# Patient Record
Sex: FEMALE | Race: BLACK OR AFRICAN AMERICAN | NOT HISPANIC OR LATINO
[De-identification: names, ages, dates, MRNs, and addresses within clinical notes are randomized per-mention and may not be internally consistent; named-entity substitution may affect disease eponyms.]

---

## 2017-12-15 ENCOUNTER — TRANSCRIPTION ENCOUNTER (OUTPATIENT)
Age: 32
End: 2017-12-15

## 2018-03-17 ENCOUNTER — TRANSCRIPTION ENCOUNTER (OUTPATIENT)
Age: 33
End: 2018-03-17

## 2019-05-28 ENCOUNTER — TRANSCRIPTION ENCOUNTER (OUTPATIENT)
Age: 34
End: 2019-05-28

## 2020-02-04 ENCOUNTER — EMERGENCY (EMERGENCY)
Facility: HOSPITAL | Age: 35
LOS: 0 days | Discharge: HOME | End: 2020-02-04
Attending: EMERGENCY MEDICINE
Payer: COMMERCIAL

## 2020-02-04 VITALS
WEIGHT: 179.9 LBS | SYSTOLIC BLOOD PRESSURE: 132 MMHG | HEIGHT: 68 IN | DIASTOLIC BLOOD PRESSURE: 97 MMHG | HEART RATE: 87 BPM | OXYGEN SATURATION: 98 % | TEMPERATURE: 98 F
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
## 2020-02-04 DIAGNOSIS — M25.512 PAIN IN LEFT SHOULDER: ICD-10-CM
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
## 2020-02-04 PROCEDURE — 99284 EMERGENCY DEPT VISIT MOD MDM: CPT

## 2020-02-04 PROCEDURE — 73030 X-RAY EXAM OF SHOULDER: CPT | Mod: 26,LT

## 2020-02-04 RX ORDER — KETOROLAC TROMETHAMINE 30 MG/ML
60 SYRINGE (ML) INJECTION ONCE
Refills: 0 | Status: DISCONTINUED | OUTPATIENT
Start: 2020-02-04 | End: 2020-02-04

## 2020-02-04 RX ORDER — OXYCODONE AND ACETAMINOPHEN 5; 325 MG/1; MG/1
1 TABLET ORAL ONCE
Refills: 0 | Status: DISCONTINUED | OUTPATIENT
Start: 2020-02-04 | End: 2020-02-04

## 2020-02-04 RX ORDER — IBUPROFEN 200 MG
1 TABLET ORAL
Qty: 15 | Refills: 0
Start: 2020-02-04 | End: 2020-02-08

## 2020-02-04 RX ORDER — IBUPROFEN 200 MG
600 TABLET ORAL ONCE
Refills: 0 | Status: DISCONTINUED | OUTPATIENT
Start: 2020-02-04 | End: 2020-02-04

## 2020-02-04 RX ADMIN — Medication 60 MILLIGRAM(S): at 05:09

## 2020-02-04 NOTE — ED PROVIDER NOTE - PATIENT PORTAL LINK FT
You can access the FollowMyHealth Patient Portal offered by [facility] by registering at the following website: [email or web address]. By joining [facility]’s FollowMyHealth portal, you will also be able to view your health information using other applications (apps) compatible with our system.

## 2020-02-04 NOTE — ED ADULT NURSE NOTE - OBJECTIVE STATEMENT
pt received to bed E c/o pain in left shoulder. pt states she injured her shoulder while lifting weights today. denies: head injury, LOC. c/o difficulty moving left arm. witnessed pt ambulate with steady gait with standby assistance. family at bedside

## 2020-02-04 NOTE — ED PROVIDER NOTE - CARE PROVIDER_API CALL
Kenneth Mina (DO)  Orthopaedic Surgery  [address] [place] [address]  Phone: [phone]  Fax: [phone]  Follow Up Time:

## 2020-02-04 NOTE — ED ADULT NURSE REASSESSMENT NOTE - NS ED NURSE REASSESS COMMENT FT1
pt denies pregnancy. pt consulting with family member and changing her mind multiple times about which medications she wants and is refusing. Dr. Barone made aware

## 2020-02-04 NOTE — ED PROVIDER NOTE - PHYSICAL EXAMINATION
Gen: Alert, NAD  Head: NC, AT   Eyes: PERRL, EOMI, normal lids/conjunctiva  ENT: normal hearing, patent oropharynx without erythema/exudate, uvula midline  Neck: supple, no tenderness, Trachea midline  Pulm: Bilateral BS, normal resp effort, no wheeze/stridor/retractions  CV: RRR, no M/R/G, 2+ radial and dp pulses bl, no edema  Abd: soft, NT/ND, +BS, no hepatosplenomegaly  Mskel: left shoulder is markedly ttp on the top of the shoulder roughly where the acromion is. no ctl spine ttp.   Skin: no rash, no bruising   Neuro: AAOx3, no sensory/motor deficits, CN 2-12 intact

## 2020-02-04 NOTE — ED ADULT NURSE NOTE - CHIEF COMPLAINT QUOTE
pt c/o L-shoulder pain since 11pm.  pt was working out with weights.  [facility] 2/3/2020

## 2020-02-04 NOTE — ED PROVIDER NOTE - NSFOLLOWUPINSTRUCTIONS_ED_ALL_ED_FT
Call the orthopedic doctor for a follow up appointment as needed.    Take the pain medications prescribed.    Apply ice to the affected area as often as you can for the next few days.

## 2020-02-04 NOTE — ED ADULT TRIAGE NOTE - CHIEF COMPLAINT QUOTE
pt c/o L-shoulder pain since 11pm.  pt was working out with weights.  [facility] 2/3/2020

## 2020-04-04 ENCOUNTER — TRANSCRIPTION ENCOUNTER (OUTPATIENT)
Age: 35
End: 2020-04-04

## 2021-04-24 ENCOUNTER — TRANSCRIPTION ENCOUNTER (OUTPATIENT)
Age: 36
End: 2021-04-24

## 2021-04-30 PROBLEM — Z78.9 OTHER SPECIFIED HEALTH STATUS: Chronic | Status: ACTIVE | Noted: 2020-02-04

## 2021-05-26 ENCOUNTER — NON-APPOINTMENT (OUTPATIENT)
Age: 36
End: 2021-05-26

## 2021-05-26 ENCOUNTER — APPOINTMENT (OUTPATIENT)
Dept: DERMATOLOGY | Facility: CLINIC | Age: 36
End: 2021-05-26
Payer: COMMERCIAL

## 2021-05-26 VITALS — WEIGHT: 195 LBS | BODY MASS INDEX: 28.88 KG/M2 | HEIGHT: 69 IN

## 2021-05-26 DIAGNOSIS — L42 PITYRIASIS ROSEA: ICD-10-CM

## 2021-05-26 DIAGNOSIS — L72.3 SEBACEOUS CYST: ICD-10-CM

## 2021-05-26 DIAGNOSIS — L70.9 ACNE, UNSPECIFIED: ICD-10-CM

## 2021-05-26 PROCEDURE — 99203 OFFICE O/P NEW LOW 30 MIN: CPT | Mod: 25

## 2021-05-26 PROCEDURE — 11900 INJECT SKIN LESIONS </W 7: CPT

## 2021-06-01 ENCOUNTER — EMERGENCY (EMERGENCY)
Facility: HOSPITAL | Age: 36
LOS: 1 days | Discharge: HOME | End: 2021-06-01
Attending: EMERGENCY MEDICINE
Payer: COMMERCIAL

## 2021-06-01 VITALS
TEMPERATURE: 98 F | RESPIRATION RATE: 19 BRPM | HEIGHT: 69 IN | OXYGEN SATURATION: 98 % | SYSTOLIC BLOOD PRESSURE: 121 MMHG | HEART RATE: 70 BPM | WEIGHT: 195.11 LBS | DIASTOLIC BLOOD PRESSURE: 74 MMHG

## 2021-06-01 LAB
ALBUMIN SERPL ELPH-MCNC: 4.2 G/DL — SIGNIFICANT CHANGE UP (ref 3.3–5)
ALP SERPL-CCNC: 53 U/L — SIGNIFICANT CHANGE UP (ref 40–120)
ALT FLD-CCNC: 15 U/L — SIGNIFICANT CHANGE UP (ref 10–45)
ANION GAP SERPL CALC-SCNC: 10 MMOL/L — SIGNIFICANT CHANGE UP (ref 5–17)
APPEARANCE UR: CLEAR — SIGNIFICANT CHANGE UP
AST SERPL-CCNC: 13 U/L — SIGNIFICANT CHANGE UP (ref 10–40)
BASOPHILS # BLD AUTO: 0.04 K/UL — SIGNIFICANT CHANGE UP (ref 0–0.2)
BASOPHILS NFR BLD AUTO: 0.8 % — SIGNIFICANT CHANGE UP (ref 0–2)
BILIRUB SERPL-MCNC: 0.2 MG/DL — SIGNIFICANT CHANGE UP (ref 0.2–1.2)
BILIRUB UR-MCNC: NEGATIVE — SIGNIFICANT CHANGE UP
BUN SERPL-MCNC: 11 MG/DL — SIGNIFICANT CHANGE UP (ref 7–23)
CALCIUM SERPL-MCNC: 9.4 MG/DL — SIGNIFICANT CHANGE UP (ref 8.4–10.5)
CHLORIDE SERPL-SCNC: 105 MMOL/L — SIGNIFICANT CHANGE UP (ref 96–108)
CO2 SERPL-SCNC: 24 MMOL/L — SIGNIFICANT CHANGE UP (ref 22–31)
COLOR SPEC: SIGNIFICANT CHANGE UP
CREAT SERPL-MCNC: 0.78 MG/DL — SIGNIFICANT CHANGE UP (ref 0.5–1.3)
DIFF PNL FLD: ABNORMAL
EOSINOPHIL # BLD AUTO: 0.12 K/UL — SIGNIFICANT CHANGE UP (ref 0–0.5)
EOSINOPHIL NFR BLD AUTO: 2.5 % — SIGNIFICANT CHANGE UP (ref 0–6)
GLUCOSE SERPL-MCNC: 88 MG/DL — SIGNIFICANT CHANGE UP (ref 70–99)
GLUCOSE UR QL: NEGATIVE — SIGNIFICANT CHANGE UP
HCG SERPL-ACNC: <2 MIU/ML — SIGNIFICANT CHANGE UP
HCT VFR BLD CALC: 38.7 % — SIGNIFICANT CHANGE UP (ref 34.5–45)
HGB BLD-MCNC: 12.2 G/DL — SIGNIFICANT CHANGE UP (ref 11.5–15.5)
IMM GRANULOCYTES NFR BLD AUTO: 0.2 % — SIGNIFICANT CHANGE UP (ref 0–1.5)
KETONES UR-MCNC: NEGATIVE — SIGNIFICANT CHANGE UP
LEUKOCYTE ESTERASE UR-ACNC: ABNORMAL
LYMPHOCYTES # BLD AUTO: 2.67 K/UL — SIGNIFICANT CHANGE UP (ref 1–3.3)
LYMPHOCYTES # BLD AUTO: 55.9 % — HIGH (ref 13–44)
MCHC RBC-ENTMCNC: 28.9 PG — SIGNIFICANT CHANGE UP (ref 27–34)
MCHC RBC-ENTMCNC: 31.5 GM/DL — LOW (ref 32–36)
MCV RBC AUTO: 91.7 FL — SIGNIFICANT CHANGE UP (ref 80–100)
MONOCYTES # BLD AUTO: 0.55 K/UL — SIGNIFICANT CHANGE UP (ref 0–0.9)
MONOCYTES NFR BLD AUTO: 11.5 % — SIGNIFICANT CHANGE UP (ref 2–14)
NEUTROPHILS # BLD AUTO: 1.39 K/UL — LOW (ref 1.8–7.4)
NEUTROPHILS NFR BLD AUTO: 29.1 % — LOW (ref 43–77)
NITRITE UR-MCNC: NEGATIVE — SIGNIFICANT CHANGE UP
NRBC # BLD: 0 /100 WBCS — SIGNIFICANT CHANGE UP (ref 0–0)
PH UR: 6.5 — SIGNIFICANT CHANGE UP (ref 5–8)
PLATELET # BLD AUTO: 179 K/UL — SIGNIFICANT CHANGE UP (ref 150–400)
POTASSIUM SERPL-MCNC: 3.9 MMOL/L — SIGNIFICANT CHANGE UP (ref 3.5–5.3)
POTASSIUM SERPL-SCNC: 3.9 MMOL/L — SIGNIFICANT CHANGE UP (ref 3.5–5.3)
PROT SERPL-MCNC: 7.2 G/DL — SIGNIFICANT CHANGE UP (ref 6–8.3)
PROT UR-MCNC: SIGNIFICANT CHANGE UP
RBC # BLD: 4.22 M/UL — SIGNIFICANT CHANGE UP (ref 3.8–5.2)
RBC # FLD: 13.6 % — SIGNIFICANT CHANGE UP (ref 10.3–14.5)
SODIUM SERPL-SCNC: 139 MMOL/L — SIGNIFICANT CHANGE UP (ref 135–145)
SP GR SPEC: 1.02 — SIGNIFICANT CHANGE UP (ref 1.01–1.02)
UROBILINOGEN FLD QL: NEGATIVE — SIGNIFICANT CHANGE UP
WBC # BLD: 4.78 K/UL — SIGNIFICANT CHANGE UP (ref 3.8–10.5)
WBC # FLD AUTO: 4.78 K/UL — SIGNIFICANT CHANGE UP (ref 3.8–10.5)

## 2021-06-01 PROCEDURE — 85025 COMPLETE CBC W/AUTO DIFF WBC: CPT

## 2021-06-01 PROCEDURE — 76830 TRANSVAGINAL US NON-OB: CPT

## 2021-06-01 PROCEDURE — 76830 TRANSVAGINAL US NON-OB: CPT | Mod: 26

## 2021-06-01 PROCEDURE — 81001 URINALYSIS AUTO W/SCOPE: CPT

## 2021-06-01 PROCEDURE — 93975 VASCULAR STUDY: CPT | Mod: 26

## 2021-06-01 PROCEDURE — 84702 CHORIONIC GONADOTROPIN TEST: CPT

## 2021-06-01 PROCEDURE — 93975 VASCULAR STUDY: CPT

## 2021-06-01 PROCEDURE — 99285 EMERGENCY DEPT VISIT HI MDM: CPT

## 2021-06-01 PROCEDURE — 76770 US EXAM ABDO BACK WALL COMP: CPT

## 2021-06-01 PROCEDURE — 76770 US EXAM ABDO BACK WALL COMP: CPT | Mod: 26,59

## 2021-06-01 PROCEDURE — 96374 THER/PROPH/DIAG INJ IV PUSH: CPT

## 2021-06-01 PROCEDURE — 80053 COMPREHEN METABOLIC PANEL: CPT

## 2021-06-01 PROCEDURE — 99284 EMERGENCY DEPT VISIT MOD MDM: CPT | Mod: 25

## 2021-06-01 RX ORDER — KETOROLAC TROMETHAMINE 30 MG/ML
15 SYRINGE (ML) INJECTION ONCE
Refills: 0 | Status: DISCONTINUED | OUTPATIENT
Start: 2021-06-01 | End: 2021-06-01

## 2021-06-01 RX ORDER — ACETAMINOPHEN 500 MG
975 TABLET ORAL ONCE
Refills: 0 | Status: COMPLETED | OUTPATIENT
Start: 2021-06-01 | End: 2021-06-01

## 2021-06-01 RX ADMIN — Medication 15 MILLIGRAM(S): at 22:34

## 2021-06-01 RX ADMIN — Medication 975 MILLIGRAM(S): at 22:35

## 2021-06-01 NOTE — ED PROVIDER NOTE - OBJECTIVE STATEMENT
37 yo F w/ no pmhx presents with 4 day hx of L back pain, and 1 day hx of L groin pain. Reports sharp and pressure like, exacerbated by walking. It is 6/10 pain. No Fevers, CP, SOB, N/V/D, dysuria. No vaginal discharges. LMP early 5/2021. Is sexually active, uses condoms sometimes. No recent trauma.

## 2021-06-01 NOTE — ED PROVIDER NOTE - PHYSICAL EXAMINATION
Vital Signs Last 24 Hrs  T(C): 36.6 (01 Jun 2021 21:00), Max: 36.9 (01 Jun 2021 18:07)  T(F): 97.8 (01 Jun 2021 21:00), Max: 98.4 (01 Jun 2021 18:07)  HR: 83 (01 Jun 2021 21:00) (70 - 83)  BP: 139/77 (01 Jun 2021 21:00) (121/74 - 139/77)  BP(mean): 96 (01 Jun 2021 21:00) (96 - 96)  RR: 16 (01 Jun 2021 21:00) (16 - 19)  SpO2: 100% (01 Jun 2021 21:00) (98% - 100%)  PHYSICAL EXAM:  GENERAL: NAD,   HEART: Regular rate and rhythm; No murmurs, rubs, or gallops  RESPIRATORY: CTA B/L, No W/R/R  ABDOMEN: Soft, Nontender, Nondistended; Bowel sounds present. NO CVA tenderness b/l. Left Groin TTP, no lesions appreciated  NEUROLOGY: A&Ox3  EXTREMITIES: No clubbing, cyanosis, or edema  SKIN: warm, dry, normal color, no rash or abnormal lesions

## 2021-06-01 NOTE — ED ADULT NURSE NOTE - OBJECTIVE STATEMENT
37 y/o F AAOX4 presents to ED c/o back/groin pain. Pt states 3 days ago, pt began experiencing a dull pressure in her left flank, began radiating to left groin yesterday. Pt states the pain worsens when walking, and began after lifting her niece. Pt breathing spontaneously, unlabored, SPO2 100% on RA. Pt abdomen soft, tender to LLQ, -CVA tenderness. LMP beginning of May, pt states she is sexually active, sometimes uses protection. Pt denies dysuria, pyuria, hematuria. Last BM this morning, pt states she is eating/drinking normally, denies n/v/d. Denies chest pain, palpitations, SOB, HA, dizziness, numbness, tingling, fever, chills. Patient placed in position of comfort, bed locked and in lowest position. Call bell within reach.

## 2021-06-01 NOTE — ED PROVIDER NOTE - NSFOLLOWUPINSTRUCTIONS_ED_ALL_ED_FT
Your back pain and groin pain is likely muscle strain. You had an ovarian cyst found on the transvaginal ultrasound, please follow up with your primary care doctor for the muscle strain, and follow up with your OBGYN doctor for this ovarian cyst. If you symptoms worsen, please seek medical attention.

## 2021-06-01 NOTE — ED PROVIDER NOTE - NS ED ROS FT
REVIEW OF SYSTEMS:  Constitutional: [-] fevers, [ -] chills, [- ] weight loss, [- ] weight gain  HEENT: [ -] vision problems, [- ] eye pain, [ -] nasal congestion, [- ] rhinorrhea, [- ] sore throat, [- ] dysphagia  CV: [- ] chest pain, [- ] orthopnea, [- ] palpitations  Resp: [- ] cough, [- ] dyspnea, [- ] wheezing, [ -] hemoptysis  GI: [- ] nausea, [- ] vomiting, [- ] diarrhea, [ -] constipation, [+ ] abdominal pain  : [- ] dysuria [- ] nocturia [- ] hematuria [ -] increased urinary frequency  Musculoskeletal: [+] back pain [ -] myalgias [- ] arthralgias [- ] fracture  Skin: [- ] rash [ -] itch  Neurological: [ -] headache [- ] dizziness [- ] syncope [- ] weakness [- ] numbness  Psychiatric: [- ] anxiety [- ] depression  Endocrine: [- ] diabetes [ -] thyroid problem  Hematologic/Lymphatic: [- ] anemia [- ] bleeding problem  Allergic/Immunologic: [ -] itchy eyes [ -] nasal discharge [- ] hives [ -] angioedema

## 2021-06-01 NOTE — ED ADULT NURSE NOTE - PRO INTERPRETER NEED 2
Include Z78.9 (Other Specified Conditions Influencing Health Status) As An Associated Diagnosis?: No Detail Level: Detailed Post-Care Instructions: I reviewed with the patient in detail post-care instructions. Patient is to wear sunprotection, and avoid picking at any of the treated lesions. Pt may apply Vaseline to crusted or scabbing areas. Number Of Freeze-Thaw Cycles: 1 freeze-thaw cycle Duration Of Freeze Thaw-Cycle (Seconds): 20 English Consent: The patient's consent was obtained including but not limited to risks of crusting, scabbing, blistering, scarring, darker or lighter pigmentary change, recurrence, incomplete removal and infection. Medical Necessity Clause: This procedure was medically necessary because the lesions that were treated were: Render Post-Care Instructions In Note?: yes Medical Necessity Information: It is in your best interest to select a reason for this procedure from the list below. All of these items fulfill various CMS LCD requirements except the new and changing color options.

## 2021-06-01 NOTE — ED PROVIDER NOTE - PROGRESS NOTE DETAILS
Attending MD Peng: pelvic (chaperone Seepersaud) with no CMT, scant whitish vaginal discharge, no adnexal ttp. TVUS: showed right complex ovarian cyst  US renal: no hydronephrosis

## 2021-06-01 NOTE — ED PROVIDER NOTE - ATTENDING CONTRIBUTION TO CARE
Attending MD Peng:  I personally have seen and examined this patient.  Resident note reviewed and agree on plan of care and except where noted.  See HPI, PE, and MDM for details.      36F with left groin/pelvic pain and back pain x days, constant, worse with standing. Ttp left pelvis region, nontender otherwise. Ddx includes ovarian pathology, ectopic pregnancy, msk strain. Plan for pelvic exam, labs, TVUS, renal US

## 2021-06-01 NOTE — ED PROVIDER NOTE - CLINICAL SUMMARY MEDICAL DECISION MAKING FREE TEXT BOX
35 yo F w/ no pmhx presents with 4 day hx of L back pain, and 1 day hx of L groin pain: ddx: Ovarian Cyst, UTI, r/o pregnancy, less likely nephrolithiasis, PID    PLAN  CBC, CMP, UA, serum HCG  Renal US, TVUS

## 2021-06-01 NOTE — ED PROVIDER NOTE - PATIENT PORTAL LINK FT
You can access the FollowMyHealth Patient Portal offered by [facility] by registering at the following website: [email or web address]. By joining [facility]’s FollowMyHealth portal, you will also be able to view your health information using other applications (apps) compatible with our system.

## 2021-06-02 VITALS
SYSTOLIC BLOOD PRESSURE: 116 MMHG | RESPIRATION RATE: 16 BRPM | TEMPERATURE: 99 F | OXYGEN SATURATION: 100 % | HEART RATE: 69 BPM | DIASTOLIC BLOOD PRESSURE: 75 MMHG

## 2021-06-26 ENCOUNTER — TRANSCRIPTION ENCOUNTER (OUTPATIENT)
Age: 36
End: 2021-06-26

## 2021-07-22 ENCOUNTER — APPOINTMENT (OUTPATIENT)
Dept: INTERNAL MEDICINE | Facility: CLINIC | Age: 36
End: 2021-07-22
Payer: COMMERCIAL

## 2021-07-22 VITALS
BODY MASS INDEX: 28.58 KG/M2 | DIASTOLIC BLOOD PRESSURE: 73 MMHG | HEART RATE: 67 BPM | WEIGHT: 193 LBS | HEIGHT: 69 IN | TEMPERATURE: 97.8 F | SYSTOLIC BLOOD PRESSURE: 115 MMHG | OXYGEN SATURATION: 99 %

## 2021-07-22 DIAGNOSIS — Z78.9 OTHER SPECIFIED HEALTH STATUS: ICD-10-CM

## 2021-07-22 DIAGNOSIS — Z00.00 ENCOUNTER FOR GENERAL ADULT MEDICAL EXAMINATION W/OUT ABNORMAL FINDINGS: ICD-10-CM

## 2021-07-22 DIAGNOSIS — E04.1 NONTOXIC SINGLE THYROID NODULE: ICD-10-CM

## 2021-07-22 DIAGNOSIS — N83.209 UNSPECIFIED OVARIAN CYST, UNSPECIFIED SIDE: ICD-10-CM

## 2021-07-22 DIAGNOSIS — E66.3 OVERWEIGHT: ICD-10-CM

## 2021-07-22 DIAGNOSIS — G89.29 DORSALGIA, UNSPECIFIED: ICD-10-CM

## 2021-07-22 DIAGNOSIS — M54.9 DORSALGIA, UNSPECIFIED: ICD-10-CM

## 2021-07-22 PROCEDURE — 99385 PREV VISIT NEW AGE 18-39: CPT | Mod: 25

## 2021-07-22 PROCEDURE — G0444 DEPRESSION SCREEN ANNUAL: CPT | Mod: 59

## 2021-07-22 PROCEDURE — 99203 OFFICE O/P NEW LOW 30 MIN: CPT | Mod: 25

## 2021-07-22 NOTE — PHYSICAL EXAM
[No Acute Distress] : no acute distress [Well Nourished] : well nourished [Well Developed] : well developed [Well-Appearing] : well-appearing [Normal Sclera/Conjunctiva] : normal sclera/conjunctiva [PERRL] : pupils equal round and reactive to light [EOMI] : extraocular movements intact [Normal TMs] : both tympanic membranes were normal [No JVD] : no jugular venous distention [No Lymphadenopathy] : no lymphadenopathy [Supple] : supple [No Respiratory Distress] : no respiratory distress  [No Accessory Muscle Use] : no accessory muscle use [Clear to Auscultation] : lungs were clear to auscultation bilaterally [Normal Rate] : normal rate  [Regular Rhythm] : with a regular rhythm [Normal S1, S2] : normal S1 and S2 [No Murmur] : no murmur heard [No Carotid Bruits] : no carotid bruits [No Abdominal Bruit] : a ~M bruit was not heard ~T in the abdomen [No Varicosities] : no varicosities [Pedal Pulses Present] : the pedal pulses are present [No Edema] : there was no peripheral edema [No Palpable Aorta] : no palpable aorta [No Extremity Clubbing/Cyanosis] : no extremity clubbing/cyanosis [Soft] : abdomen soft [Non Tender] : non-tender [Non-distended] : non-distended [No Masses] : no abdominal mass palpated [No HSM] : no HSM [Normal Bowel Sounds] : normal bowel sounds [Normal Posterior Cervical Nodes] : no posterior cervical lymphadenopathy [Normal Anterior Cervical Nodes] : no anterior cervical lymphadenopathy [No Spinal Tenderness] : no spinal tenderness [No CVA Tenderness] : no CVA  tenderness [No Joint Swelling] : no joint swelling [Grossly Normal Strength/Tone] : grossly normal strength/tone [No Rash] : no rash [Coordination Grossly Intact] : coordination grossly intact [No Focal Deficits] : no focal deficits [Speech Grossly Normal] : speech grossly normal [Memory Grossly Normal] : memory grossly normal [Normal Affect] : the affect was normal [Alert and Oriented x3] : oriented to person, place, and time [Normal Mood] : the mood was normal [Normal Insight/Judgement] : insight and judgment were intact [de-identified] : Patient wearing protective face mask  [de-identified] : Right sided thyroid nodule at the inferior margins.

## 2021-07-22 NOTE — HISTORY OF PRESENT ILLNESS
[FreeTextEntry1] : Patient presents for new patient evaluation and CPE.  [de-identified] : Patient is a y o  female, presents for a routine physical exam.\gabrielle Last examined in April by Dr. Kapadia, due to a 2 week hx of skin breakout dermatitis with pruritus, was prescribed abx-  wit no relief. Self resolved.\gabrielle Was also seen by a dermatologist for 3 boils on submandibular location, which resolved 2 days after steroid injection.  \gabrielle Since then, in June, has had left sided groin pain that radiates to the left hip confirmed in ED to be a ovarian cyst rupture. Is scheduled to see OBGYN in the next 2 weeks.\par \par Would like to establish car///e with new PCP. sharp \par C/o of 4mo hx intermittent 8/10 sharp pressure, in the right hip, worse with prolonged standing and better with motrin. Noticed then pain a day after baby sitting and picking up her niece.  \par

## 2021-07-22 NOTE — HEALTH RISK ASSESSMENT
[Very Good] : ~his/her~ current health as very good [Good] : ~his/her~  mood as  good [Yes] : Yes [2 - 3 times a week (3 pts)] : 2 - 3  times a week (3 points) [1 or 2 (0 pts)] : 1 or 2 (0 points) [Never (0 pts)] : Never (0 points) [No] : In the past 12 months have you used drugs other than those required for medical reasons? No [No falls in past year] : Patient reported no falls in the past year [0] : 2) Feeling down, depressed, or hopeless: Not at all (0) [HIV Test offered] : HIV Test offered [None] : None [Alone] : lives alone [Employed] : employed [College] : College [Single] : single [Significant Other] : lives with significant other [Sexually Active] : sexually active [Feels Safe at Home] : Feels safe at home [Fully functional (bathing, dressing, toileting, transferring, walking, feeding)] : Fully functional (bathing, dressing, toileting, transferring, walking, feeding) [Fully functional (using the telephone, shopping, preparing meals, housekeeping, doing laundry, using] : Fully functional and needs no help or supervision to perform IADLs (using the telephone, shopping, preparing meals, housekeeping, doing laundry, using transportation, managing medications and managing finances) [Smoke Detector] : smoke detector [Carbon Monoxide Detector] : carbon monoxide detector [Guns at Home] : guns at home [Safety elements used in home] : safety elements used in home [Seat Belt] :  uses seat belt [Sunscreen] : uses sunscreen [TB Exposure] : is being exposed to tuberculosis [Name: ___] : Health Care Proxy's Name: [unfilled]  [Relationship: ___] : Relationship: [unfilled] [I will adhere to the patient's wishes.] : I will adhere to the patient's wishes. [PHQ-2 Negative - No further assessment needed] : PHQ-2 Negative - No further assessment needed [With Patient/Caregiver] : , with patient/caregiver [Time Spent: ___ minutes] : Time Spent: [unfilled] minutes [] : No [de-identified] : Dermatology  [Audit-CScore] : 3 [de-identified] : Run, walk [de-identified] : Little bit of everything [[id number]] : 0 [Patient reported PAP Smear was normal] : Patient reported PAP Smear was normal [Hepatitis C test offered] : Hepatitis C test offered [Change in mental status noted] : No change in mental status noted [Language] : denies difficulty with language [Behavior] : denies difficulty with behavior [Learning/Retaining New Information] : denies difficulty learning/retaining new information [Handling Complex Tasks] : denies difficulty handling complex tasks [Reasoning] : denies difficulty with reasoning [Spatial Ability and Orientation] : denies difficulty with spatial ability and orientation [High Risk Behavior] : no high risk behavior [Reports changes in hearing] : Reports no changes in hearing [Reports changes in vision] : Reports no changes in vision [Reports normal functional visual acuity (ie: able to read med bottle)] : Reports poor functional visual acuity.  [Reports changes in dental health] : Reports no changes in dental health [Travel to Developing Areas] : does not  travel to developing areas [Caregiver Concerns] : does not have caregiver concerns [PapSmearDate] : 06/20 [de-identified] :  [Aggressive treatment] : aggressive treatment [AdvancecareDate] : 07/21 [FreeTextEntry4] :  [phone]

## 2021-07-22 NOTE — PLAN
[FreeTextEntry1] : - Return for Tdap in approximately 1 month\par  - Labs done in office\par - Further management pending lab results

## 2021-07-28 DIAGNOSIS — E55.9 VITAMIN D DEFICIENCY, UNSPECIFIED: ICD-10-CM

## 2021-07-28 LAB
25(OH)D3 SERPL-MCNC: 19.2 NG/ML
ALBUMIN SERPL ELPH-MCNC: 4.9 G/DL
ALP BLD-CCNC: 59 U/L
ALT SERPL-CCNC: 21 U/L
ANION GAP SERPL CALC-SCNC: 10 MMOL/L
AST SERPL-CCNC: 17 U/L
BASOPHILS # BLD AUTO: 0.03 K/UL
BASOPHILS NFR BLD AUTO: 0.8 %
BILIRUB SERPL-MCNC: 0.3 MG/DL
BUN SERPL-MCNC: 12 MG/DL
CALCIUM SERPL-MCNC: 10.4 MG/DL
CHLORIDE SERPL-SCNC: 100 MMOL/L
CHOLEST SERPL-MCNC: 229 MG/DL
CO2 SERPL-SCNC: 26 MMOL/L
CREAT SERPL-MCNC: 0.81 MG/DL
EOSINOPHIL # BLD AUTO: 0.09 K/UL
EOSINOPHIL NFR BLD AUTO: 2.4 %
ESTIMATED AVERAGE GLUCOSE: 108 MG/DL
GLUCOSE SERPL-MCNC: 91 MG/DL
HBA1C MFR BLD HPLC: 5.4 %
HCG UR QL: NEGATIVE
HCT VFR BLD CALC: 42.9 %
HCV AB SER QL: NONREACTIVE
HCV S/CO RATIO: 0.2 S/CO
HDLC SERPL-MCNC: 88 MG/DL
HGB BLD-MCNC: 13.7 G/DL
HIV1+2 AB SPEC QL IA.RAPID: NONREACTIVE
IMM GRANULOCYTES NFR BLD AUTO: 0 %
LDLC SERPL CALC-MCNC: 130 MG/DL
LYMPHOCYTES # BLD AUTO: 2.22 K/UL
LYMPHOCYTES NFR BLD AUTO: 59.2 %
MAN DIFF?: NORMAL
MCHC RBC-ENTMCNC: 29.5 PG
MCHC RBC-ENTMCNC: 31.9 GM/DL
MCV RBC AUTO: 92.3 FL
MONOCYTES # BLD AUTO: 0.3 K/UL
MONOCYTES NFR BLD AUTO: 8 %
NEUTROPHILS # BLD AUTO: 1.11 K/UL
NEUTROPHILS NFR BLD AUTO: 29.6 %
NONHDLC SERPL-MCNC: 141 MG/DL
PLATELET # BLD AUTO: 219 K/UL
POTASSIUM SERPL-SCNC: 5.3 MMOL/L
PROT SERPL-MCNC: 7.7 G/DL
RBC # BLD: 4.65 M/UL
RBC # FLD: 13.9 %
SODIUM SERPL-SCNC: 136 MMOL/L
TRIGL SERPL-MCNC: 55 MG/DL
TSH SERPL-ACNC: 0.72 UIU/ML
WBC # FLD AUTO: 3.75 K/UL

## 2021-08-23 ENCOUNTER — TRANSCRIPTION ENCOUNTER (OUTPATIENT)
Age: 36
End: 2021-08-23

## 2021-08-24 ENCOUNTER — TRANSCRIPTION ENCOUNTER (OUTPATIENT)
Age: 36
End: 2021-08-24

## 2021-09-17 ENCOUNTER — NON-APPOINTMENT (OUTPATIENT)
Age: 36
End: 2021-09-17

## 2021-09-27 ENCOUNTER — APPOINTMENT (OUTPATIENT)
Dept: INTERNAL MEDICINE | Facility: CLINIC | Age: 36
End: 2021-09-27

## 2021-10-07 ENCOUNTER — NON-APPOINTMENT (OUTPATIENT)
Age: 36
End: 2021-10-07

## 2022-02-28 ENCOUNTER — APPOINTMENT (OUTPATIENT)
Dept: OBGYN | Facility: CLINIC | Age: 37
End: 2022-02-28

## 2022-03-22 ENCOUNTER — APPOINTMENT (OUTPATIENT)
Dept: OBGYN | Facility: CLINIC | Age: 37
End: 2022-03-22

## 2022-11-09 ENCOUNTER — INPATIENT (INPATIENT)
Facility: HOSPITAL | Age: 37
LOS: 3 days | Discharge: HOME | DRG: 760 | End: 2022-11-13
Attending: STUDENT IN AN ORGANIZED HEALTH CARE EDUCATION/TRAINING PROGRAM | Admitting: HOSPITALIST
Payer: COMMERCIAL

## 2022-11-09 VITALS
RESPIRATION RATE: 20 BRPM | HEIGHT: 69 IN | OXYGEN SATURATION: 96 % | WEIGHT: 197.09 LBS | TEMPERATURE: 98 F | SYSTOLIC BLOOD PRESSURE: 104 MMHG | HEART RATE: 81 BPM | DIASTOLIC BLOOD PRESSURE: 75 MMHG

## 2022-11-09 DIAGNOSIS — Z29.9 ENCOUNTER FOR PROPHYLACTIC MEASURES, UNSPECIFIED: ICD-10-CM

## 2022-11-09 DIAGNOSIS — N93.9 ABNORMAL UTERINE AND VAGINAL BLEEDING, UNSPECIFIED: ICD-10-CM

## 2022-11-09 DIAGNOSIS — R41.82 ALTERED MENTAL STATUS, UNSPECIFIED: ICD-10-CM

## 2022-11-09 LAB
ALBUMIN SERPL ELPH-MCNC: 4.3 G/DL — SIGNIFICANT CHANGE UP (ref 3.3–5)
ALP SERPL-CCNC: 43 U/L — SIGNIFICANT CHANGE UP (ref 40–120)
ALT FLD-CCNC: 18 U/L — SIGNIFICANT CHANGE UP (ref 10–45)
ANION GAP SERPL CALC-SCNC: 8 MMOL/L — SIGNIFICANT CHANGE UP (ref 5–17)
APTT BLD: 28.2 SEC — SIGNIFICANT CHANGE UP (ref 27.5–35.5)
AST SERPL-CCNC: 20 U/L — SIGNIFICANT CHANGE UP (ref 10–40)
BASOPHILS # BLD AUTO: 0.02 K/UL — SIGNIFICANT CHANGE UP (ref 0–0.2)
BASOPHILS # BLD AUTO: 0.03 K/UL — SIGNIFICANT CHANGE UP (ref 0–0.2)
BASOPHILS NFR BLD AUTO: 0.5 % — SIGNIFICANT CHANGE UP (ref 0–2)
BASOPHILS NFR BLD AUTO: 0.6 % — SIGNIFICANT CHANGE UP (ref 0–2)
BILIRUB SERPL-MCNC: 0.5 MG/DL — SIGNIFICANT CHANGE UP (ref 0.2–1.2)
BLD GP AB SCN SERPL QL: NEGATIVE — SIGNIFICANT CHANGE UP
BUN SERPL-MCNC: 14 MG/DL — SIGNIFICANT CHANGE UP (ref 7–23)
CALCIUM SERPL-MCNC: 9.3 MG/DL — SIGNIFICANT CHANGE UP (ref 8.4–10.5)
CHLORIDE SERPL-SCNC: 105 MMOL/L — SIGNIFICANT CHANGE UP (ref 96–108)
CO2 SERPL-SCNC: 26 MMOL/L — SIGNIFICANT CHANGE UP (ref 22–31)
CREAT SERPL-MCNC: 0.82 MG/DL — SIGNIFICANT CHANGE UP (ref 0.5–1.3)
EGFR: 96 ML/MIN/1.73M2 — SIGNIFICANT CHANGE UP
EOSINOPHIL # BLD AUTO: 0.05 K/UL — SIGNIFICANT CHANGE UP (ref 0–0.5)
EOSINOPHIL # BLD AUTO: 0.08 K/UL — SIGNIFICANT CHANGE UP (ref 0–0.5)
EOSINOPHIL NFR BLD AUTO: 0.8 % — SIGNIFICANT CHANGE UP (ref 0–6)
EOSINOPHIL NFR BLD AUTO: 2.2 % — SIGNIFICANT CHANGE UP (ref 0–6)
GLUCOSE SERPL-MCNC: 114 MG/DL — HIGH (ref 70–99)
HCG SERPL-ACNC: 2767 MIU/ML — HIGH
HCT VFR BLD CALC: 26 % — LOW (ref 34.5–45)
HCT VFR BLD CALC: 31.3 % — LOW (ref 34.5–45)
HGB BLD-MCNC: 8.3 G/DL — LOW (ref 11.5–15.5)
HGB BLD-MCNC: 9.8 G/DL — LOW (ref 11.5–15.5)
IMM GRANULOCYTES NFR BLD AUTO: 0.3 % — SIGNIFICANT CHANGE UP (ref 0–0.9)
IMM GRANULOCYTES NFR BLD AUTO: 0.3 % — SIGNIFICANT CHANGE UP (ref 0–0.9)
INR BLD: 1.17 RATIO — HIGH (ref 0.88–1.16)
LYMPHOCYTES # BLD AUTO: 1.45 K/UL — SIGNIFICANT CHANGE UP (ref 1–3.3)
LYMPHOCYTES # BLD AUTO: 1.56 K/UL — SIGNIFICANT CHANGE UP (ref 1–3.3)
LYMPHOCYTES # BLD AUTO: 23.1 % — SIGNIFICANT CHANGE UP (ref 13–44)
LYMPHOCYTES # BLD AUTO: 43.7 % — SIGNIFICANT CHANGE UP (ref 13–44)
MCHC RBC-ENTMCNC: 28.8 PG — SIGNIFICANT CHANGE UP (ref 27–34)
MCHC RBC-ENTMCNC: 29.4 PG — SIGNIFICANT CHANGE UP (ref 27–34)
MCHC RBC-ENTMCNC: 31.3 GM/DL — LOW (ref 32–36)
MCHC RBC-ENTMCNC: 31.9 GM/DL — LOW (ref 32–36)
MCV RBC AUTO: 92.1 FL — SIGNIFICANT CHANGE UP (ref 80–100)
MCV RBC AUTO: 92.2 FL — SIGNIFICANT CHANGE UP (ref 80–100)
MONOCYTES # BLD AUTO: 0.38 K/UL — SIGNIFICANT CHANGE UP (ref 0–0.9)
MONOCYTES # BLD AUTO: 0.39 K/UL — SIGNIFICANT CHANGE UP (ref 0–0.9)
MONOCYTES NFR BLD AUTO: 10.9 % — SIGNIFICANT CHANGE UP (ref 2–14)
MONOCYTES NFR BLD AUTO: 6.1 % — SIGNIFICANT CHANGE UP (ref 2–14)
NEUTROPHILS # BLD AUTO: 1.51 K/UL — LOW (ref 1.8–7.4)
NEUTROPHILS # BLD AUTO: 4.35 K/UL — SIGNIFICANT CHANGE UP (ref 1.8–7.4)
NEUTROPHILS NFR BLD AUTO: 42.3 % — LOW (ref 43–77)
NEUTROPHILS NFR BLD AUTO: 69.2 % — SIGNIFICANT CHANGE UP (ref 43–77)
NRBC # BLD: 0 /100 WBCS — SIGNIFICANT CHANGE UP (ref 0–0)
NRBC # BLD: 0 /100 WBCS — SIGNIFICANT CHANGE UP (ref 0–0)
PLATELET # BLD AUTO: 192 K/UL — SIGNIFICANT CHANGE UP (ref 150–400)
PLATELET # BLD AUTO: 216 K/UL — SIGNIFICANT CHANGE UP (ref 150–400)
POTASSIUM SERPL-MCNC: 3.9 MMOL/L — SIGNIFICANT CHANGE UP (ref 3.5–5.3)
POTASSIUM SERPL-SCNC: 3.9 MMOL/L — SIGNIFICANT CHANGE UP (ref 3.5–5.3)
PROT SERPL-MCNC: 7 G/DL — SIGNIFICANT CHANGE UP (ref 6–8.3)
PROTHROM AB SERPL-ACNC: 13.6 SEC — HIGH (ref 10.5–13.4)
RBC # BLD: 2.82 M/UL — LOW (ref 3.8–5.2)
RBC # BLD: 3.4 M/UL — LOW (ref 3.8–5.2)
RBC # FLD: 13.8 % — SIGNIFICANT CHANGE UP (ref 10.3–14.5)
RBC # FLD: 13.9 % — SIGNIFICANT CHANGE UP (ref 10.3–14.5)
RH IG SCN BLD-IMP: NEGATIVE — SIGNIFICANT CHANGE UP
SARS-COV-2 RNA SPEC QL NAA+PROBE: SIGNIFICANT CHANGE UP
SODIUM SERPL-SCNC: 139 MMOL/L — SIGNIFICANT CHANGE UP (ref 135–145)
UFH PPP CHRO-ACNC: 0.04 IU/ML — LOW (ref 0.3–0.7)
WBC # BLD: 3.57 K/UL — LOW (ref 3.8–10.5)
WBC # BLD: 6.28 K/UL — SIGNIFICANT CHANGE UP (ref 3.8–10.5)
WBC # FLD AUTO: 3.57 K/UL — LOW (ref 3.8–10.5)
WBC # FLD AUTO: 6.28 K/UL — SIGNIFICANT CHANGE UP (ref 3.8–10.5)

## 2022-11-09 PROCEDURE — 99233 SBSQ HOSP IP/OBS HIGH 50: CPT

## 2022-11-09 PROCEDURE — 76817 TRANSVAGINAL US OBSTETRIC: CPT | Mod: 26

## 2022-11-09 PROCEDURE — 99221 1ST HOSP IP/OBS SF/LOW 40: CPT

## 2022-11-09 PROCEDURE — 99223 1ST HOSP IP/OBS HIGH 75: CPT

## 2022-11-09 PROCEDURE — 93975 VASCULAR STUDY: CPT | Mod: 26

## 2022-11-09 PROCEDURE — 70450 CT HEAD/BRAIN W/O DYE: CPT | Mod: 26,MA

## 2022-11-09 PROCEDURE — 99285 EMERGENCY DEPT VISIT HI MDM: CPT

## 2022-11-09 RX ORDER — SODIUM CHLORIDE 9 MG/ML
1000 INJECTION, SOLUTION INTRAVENOUS ONCE
Refills: 0 | Status: COMPLETED | OUTPATIENT
Start: 2022-11-09 | End: 2022-11-09

## 2022-11-09 RX ORDER — SODIUM CHLORIDE 9 MG/ML
1000 INJECTION, SOLUTION INTRAVENOUS
Refills: 0 | Status: DISCONTINUED | OUTPATIENT
Start: 2022-11-09 | End: 2022-11-12

## 2022-11-09 RX ORDER — LANOLIN ALCOHOL/MO/W.PET/CERES
3 CREAM (GRAM) TOPICAL AT BEDTIME
Refills: 0 | Status: DISCONTINUED | OUTPATIENT
Start: 2022-11-09 | End: 2022-11-13

## 2022-11-09 RX ORDER — ACETAMINOPHEN 500 MG
650 TABLET ORAL EVERY 6 HOURS
Refills: 0 | Status: DISCONTINUED | OUTPATIENT
Start: 2022-11-09 | End: 2022-11-13

## 2022-11-09 RX ORDER — ONDANSETRON 8 MG/1
4 TABLET, FILM COATED ORAL EVERY 8 HOURS
Refills: 0 | Status: DISCONTINUED | OUTPATIENT
Start: 2022-11-09 | End: 2022-11-13

## 2022-11-09 RX ADMIN — SODIUM CHLORIDE 1000 MILLILITER(S): 9 INJECTION, SOLUTION INTRAVENOUS at 16:50
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
## 2022-11-09 NOTE — H&P ADULT - PROBLEM SELECTOR PLAN 2
P/w acute heavy vag bleed i/s/o recent D&C apx 2wk ago   - In ED, experienced multiple brief episode of unresponsiveness   - Endorse poor PO intake  (only drank coffee today)  - AMS possibly 2/2 hypovolemia from poor intake c/b vag bleed vs seizure   - CT head: neg for intracranial pathology   - IVF  - If continued AMS episode after vag bleed stops & fluid resuscitation consider EEG & MRI luigi w & w/o ctx   - Apprec Neuro rec P/w acute heavy vag bleed i/s/o recent D&C apx 2wk ago   - In ED, experienced multiple brief episode of unresponsiveness   - Endorse poor PO intake  (only drank coffee today)  - AMS possibly 2/2 hypovolemia from poor intake c/b vag bleed vs seizure   - CT head: neg for intracranial pathology   - IVF  - If continued AMS episode after vag bleed stops & fluid resuscitation   consider EEG & MRI brain w & w/o ctx   - Apprec Neuro rec

## 2022-11-09 NOTE — ED PROVIDER NOTE - CLINICAL SUMMARY MEDICAL DECISION MAKING FREE TEXT BOX
Patient is a 37 year old female with no significant past medical history presents to the Emergency Department with chief complaint of vaginal bleeding.  Patient was evaluated for genitourinary etiology of symptoms.  Patient received transvaginal ultrasound and labs.

## 2022-11-09 NOTE — CONSULT NOTE ADULT - ASSESSMENT
Patient KAYLIE HODGES is a 37y (1985) woman with no significant PMH presented vaginal bleeding for 3 weeks after miscarriage (D+C). Neurology consulted for AMS. Per family at bedside and ED, patient had 3 episodes where she lost consciousness, episodes lasted for couple of seconds. She is also complaining that she couldn't hear out of her left side of her ear. Patient reports feeling tired and hungry. She has not eaten today and only drank coffee. Denies weakness, numbness, changes to speech, changes to vision. Exam nonfocal. HG dowtrended from 9.8->8.3, patient hypotensive SBP 80's.     Impression: AMS and syncope likely due to Hypovolemia given active bleeding, downtrending hemoglobin and hypotension     Recommendation:  [x] CTH (results as above)  [] IVF as needed  [] No further neurological imaging at this time, continue to monitor and recall if symptoms persist   [] CBC, CMP, Coag, Mag, phos. trend HB  [] BCx, UA  [] NH3  [] Folate, B12, B1, B6  [] TSH, free t3    Case to be seen and discussed with Neurology attending.  Patient KAYLIE HODGES is a 37y (1985) woman with no significant PMH presented vaginal bleeding for 3 weeks after miscarriage (D+C). Neurology consulted for AMS. Per family at bedside and ED, patient had 3 episodes where she lost consciousness, episodes lasted for couple of seconds. She is also complaining that she couldn't hear out of her left side of her ear. Patient reports feeling tired and hungry. She has not eaten today and only drank coffee. Denies weakness, numbness, changes to speech, changes to vision. Exam nonfocal. HG dowtrended from 9.8->8.3, patient hypotensive SBP 80's.     Impression: AMS and syncope likely due to Hypovolemia given active bleeding, downtrending hemoglobin and hypotension, less likely seizure    Recommendation:  [x] CTH (results as above)  [] IVF as needed  [] No further neurological imaging at this time, if symptoms persist after Fluid resuscitation and bleeding resolves then can consider rEEG and MRI brain with and without.   [] CBC, CMP, Coag, Mag, phos. trend HB  [] BCx, UA  [] NH3  [] Folate, B12, B1, B6  [] TSH, free t3    Case to be seen and discussed with Neurology attending.  Patient KAYLIE HODGES is a 37y (1985) woman with no significant PMH presented vaginal bleeding for 3 weeks after miscarriage (D+C). Neurology consulted for AMS. Per family at bedside and ED, patient had 3 episodes where she lost consciousness, episodes lasted for couple of seconds. She is also complaining that she couldn't hear out of her left side of her ear. Patient reports feeling tired and hungry. She has not eaten today and only drank coffee. Denies weakness, numbness, changes to speech, changes to vision. Exam nonfocal. HG dowtrended from 9.8->8.3, patient hypotensive SBP 80's.     Impression: AMS and syncope likely due to Hypovolemia given active bleeding, downtrending hemoglobin and hypotension, less likely seizure    Recommendation:  [x] CTH (results as above)  [] IVF as needed  [] No further neurological imaging at this time, if symptoms persist after Fluid resuscitation and bleeding resolves then can consider rEEG and MRI brain with and without.   [] CBC, CMP, Coag, Mag, phos. trend HB  [] BCx, UA  [] NH3  [] Folate, B12, B1, B6  [] TSH, free t3    Discussed with on call Neurology attending. Case to be seen with Neurology attending.
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
## 2022-11-09 NOTE — ED PROVIDER NOTE - NS ED ROS FT
CONSTITUTIONAL: No weakness, fevers or chills  EYES/ENT: No visual changes;  No vertigo or throat pain   NECK: No pain or stiffness  RESPIRATORY: No cough, wheezing, hemoptysis; No shortness of breath  CARDIOVASCULAR: No chest pain or palpitations  GASTROINTESTINAL: No abdominal or epigastric pain. No nausea, vomiting, or hematemesis; No diarrhea or constipation. No melena or hematochezia.  GENITOURINARY: + vaginal bleeding, pelvic cramping  NEUROLOGICAL: No numbness or weakness  SKIN: No itching, burning, rashes, or lesions   All other review of systems is negative unless indicated above.

## 2022-11-09 NOTE — ED PROVIDER NOTE - ATTENDING CONTRIBUTION TO CARE
Attending MD Arana: I personally have seen and examined this patient.  Fellow note reviewed and agree on plan of care and except where noted.  See below for details.     Seen in [place] [facility], accompanied by friend    37F with PMH/PSH including Attending MD Arana: I personally have seen and examined this patient.  Fellow note reviewed and agree on plan of care and except where noted.  See below for details.     Seen in [place] [facility], accompanied by friend  PMD Dr. Collin Kapadia  Gyn Dr. Yeimi Jay    37F with PMH/PSH including    TO BE COMPLETED

## 2022-11-09 NOTE — H&P ADULT - ASSESSMENT
37y , LMP end of Aug, no PMH present for heavy vaginal bleed iso recent D&C few weeks ago, admitted for further care

## 2022-11-09 NOTE — ED PROVIDER NOTE - PHYSICAL EXAMINATION
PHYSICAL EXAM:  GENERAL: NAD, lying in bed comfortably  HEAD:  Atraumatic, Normocephalic  EYES: EOMI, PERRLA, conjunctiva and sclera clear  ENT: No erythema/pallor/petechiae/lesions; TMs clear b/l  NECK: Supple, No JVD  LUNG: CTA b/l; no r/r/w  HEART: RRR, +S1/S2; No m/r/g  ABDOMEN: soft, NT/ND; BS audible   : clots in vaginal vault with associated light bleeding  EXTREMITIES:  2+ Peripheral Pulses, brisk cap refill. No clubbing, cyanosis, or edema  NERVOUS SYSTEM:  AAOx3, speech clear. No sensory/motor deficits   MSK: FROM all 4 extremities, full and equal strength  SKIN: No rashes or lesions    Physical exam performed in presence of female chaperone - PCA.
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
## 2022-11-09 NOTE — H&P ADULT - HISTORY OF PRESENT ILLNESS
37y , LMP end of Aug, no PMH present for heavy vaginal bleed.  Pt had incomplete  & went to [facility] for D&C apx 2wk ago.    Since then she had  period like bleeding, that acutely worsened around 1pm today.   She experienced heavy vaginal bleeding astd w/cramping & large clots that soaked through multiple pads.     In ED had multiple brief episode of unresponsiveness, was eval by Neuro in ED  Was also seen by Ob-Gyn in ED   Had drop in Hg in setting of vag bleed and had 1u PRBC ordered     At time of encounter, pt resting in ED bed, NAD.  She denies HA, dizziness, CP/SOB, N/V, fevers/chills  37y , LMP end of Aug, no PMH present for heavy vaginal bleed.  Pt had incomplete  & went to [facility] for D&C apx 2wk ago.    Since then she had  period like bleeding, that acutely worsened around 1pm today.   She experienced heavy vaginal bleeding astd w/cramping & large clots that soaked through multiple pads.     In ED had multiple brief episode of unresponsiveness, was eval by Neuro in ED  Was also seen by Ob-Gyn in ED   Had drop in Hg i/s/o vag bleed, 1u PRBC ordered     At time of encounter, pt resting in ED bed, NAD.  She denies HA, dizziness, CP/SOB, N/V, fevers/chills

## 2022-11-09 NOTE — H&P ADULT - PROBLEM SELECTOR PLAN 1
P/w acute heavy vag bleed i/s/o recent D&C apx 2wk ago   - In ED experienced multiple brief episode of unresponsiveness    - Was also noted to have acute drop in hg 9.8>> 8.3  - Given 1u PRBC in ED, f/u post transfusion CBC   - Received Rhogam, KB to eval need for additional doses   - TVUS obtained and neg for retained products of conception   - Hcg 2767, trend Hcg   - Pad count  - IVF   - keep active T&S  - Cytotec 600mcg vaginal  - F/u UA/ Ucx   - Apprec Ob-Gyn rec P/w acute heavy vag bleed i/s/o recent D&C apx 2wk ago   - In ED experienced multiple brief episode of unresponsiveness    - Was also noted to have acute drop in hg 9.8>> 8.3  - Given 1u PRBC in ED, f/u post transfusion CBC   - Received Rhogam, KB (to be done by OB) to eval need for additional doses   - TVUS obtained and neg for retained products of conception   - Hcg 2767, trend Hcg to ensure levels are down trending   - Pad count  - IVF   - keep active T&S  - Cytotec 600mcg vaginal, to be performed by OB-Gyn   - F/u UA/ Ucx   - Apprec Ob-Gyn rec

## 2022-11-09 NOTE — H&P ADULT - NSHPPHYSICALEXAM_GEN_ALL_CORE
Vital Signs Last 24 Hrs  T(C): 36.9 (09 Nov 2022 22:00), Max: 36.9 (09 Nov 2022 15:42)  T(F): 98.4 (09 Nov 2022 22:00), Max: 98.5 (09 Nov 2022 15:42)  HR: 66 (09 Nov 2022 22:00) (66 - 82)  BP: 113/71 (09 Nov 2022 22:00) (99/64 - 113/71)  BP(mean): 82 (09 Nov 2022 22:00) (75 - 82)  RR: 16 (09 Nov 2022 22:00) (16 - 20)  SpO2: 98% (09 Nov 2022 22:00) (96% - 100%)    Parameters below as of 09 Nov 2022 22:00  Patient On (Oxygen Delivery Method): room air

## 2022-11-09 NOTE — H&P ADULT - NSHPLABSRESULTS_GEN_ALL_CORE
EKG personally reviewed:    Images personally reviewed:    < from: CT Head No Cont (11.09.22 @ 17:32) >  IMPRESSION: No acute intracranial pathology. If altered mental   status/shaking episodes persists, consider further evaluation via MR   imaging to include DWI and ADC mapping techniques, provided there are no   contraindications.  < end of copied text >      < from: US Transvaginal, OB (11.09.22 @ 20:10) >  IMPRESSION:  Normal endometrial thickness measuring 4 mm without hypervascularity. No   findings to suggest retained products of conception.    Uterine fibroids, the largest measuring up to 4 cm in the lower uterine   segment.    Unremarkable ovaries.  < end of copied text >

## 2022-11-09 NOTE — ED PROVIDER NOTE - OBJECTIVE STATEMENT
Patient is a 37 year old female with no significant past medical history presents to the Emergency Department with chief complaint of vaginal bleeding.  Patient states she had a d and c performed 3 weeks prior secondary to a miscarriage and has had mild vaginal bleeding since that time.  Patient reports going through 1 pad every 1-3 days; however 3 hours prior to presentation she developed heavy vaginal bleeding with clots.  Patient denies similar symptoms in the past.  Patient notes associated pelvic cramping.  Patient denies any fever, chest pain, dyspnea, or other physical complaints.  No sick contacts or recent travel.
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
## 2022-11-09 NOTE — ED PROVIDER NOTE - PROGRESS NOTE DETAILS
DO Wilma: called to the room as patient became increasingly tachypneic with transient loss of consciousness.  no seizure-like activity witnessed.  patient with increased anxiety and apologizing to staff.  patient with transient hypotension, which responded to IV fluids.  OB/GYN paged. Attending MD Arana: Returned from CT, again reporting L ear, "wind" in ear, began reporting "feeling hot" again and requesting "I just need water" similar to last time MD called to room.  Had episode of transient loss of consciousness.  Patient appeared calm at time of onset, not hyperventilating at this time.  Will review CTH (just completed, was returning from CT) and will consult neuro.  Gyn in Emergency Department to see patient. Attending MD Arana: Seen by Gyn team, awaiting US, neuro aware, reports will come down.  Gyn recommends RhoGam.  Patient had procedure at [facility] in [place]. DO Wilma: repeat hemoglobin results discussed with OB/GYN.  patient consented for blood and transfused 1 unit of PRBC at this time. Attending MD Arana: US at bedside, blood hanging Attending MD Arana: Spoke with neurology, suspect hypotension, neuro to be called if repeat episode.  Spoke with Gyn, will follow.  Will admit to medicine.  Patient, family and friends at bedside updated. Attending MD Arana: Gyn team with attending in the Emergency Department evaluated patient, recommend Cytotec for patient (will write), recommend pad count, IV hydration.

## 2022-11-09 NOTE — ED ADULT TRIAGE NOTE - CHIEF COMPLAINT QUOTE
Heavy vaginal bleeding x3 hours with large clots, abdominal pain, and dizziness. Received D&C x3 weeks ago.

## 2022-11-10 LAB
ANION GAP SERPL CALC-SCNC: 9 MMOL/L — SIGNIFICANT CHANGE UP (ref 5–17)
BUN SERPL-MCNC: 14 MG/DL — SIGNIFICANT CHANGE UP (ref 7–23)
CALCIUM SERPL-MCNC: 8.4 MG/DL — SIGNIFICANT CHANGE UP (ref 8.4–10.5)
CHLORIDE SERPL-SCNC: 104 MMOL/L — SIGNIFICANT CHANGE UP (ref 96–108)
CO2 SERPL-SCNC: 23 MMOL/L — SIGNIFICANT CHANGE UP (ref 22–31)
CREAT SERPL-MCNC: 0.71 MG/DL — SIGNIFICANT CHANGE UP (ref 0.5–1.3)
EGFR: 114 ML/MIN/1.73M2 — SIGNIFICANT CHANGE UP
FOLATE SERPL-MCNC: 12.5 NG/ML — SIGNIFICANT CHANGE UP
GLUCOSE SERPL-MCNC: 114 MG/DL — HIGH (ref 70–99)
HCG SERPL-ACNC: 2153 MIU/ML — HIGH
HCT VFR BLD CALC: 24.2 % — LOW (ref 34.5–45)
HCT VFR BLD CALC: 25.8 % — LOW (ref 34.5–45)
HGB BLD-MCNC: 7.9 G/DL — LOW (ref 11.5–15.5)
HGB BLD-MCNC: 8.2 G/DL — LOW (ref 11.5–15.5)
KLEIHAUER-BETKE CALCULATION: 0 % — SIGNIFICANT CHANGE UP (ref 0–0.3)
MCHC RBC-ENTMCNC: 28.9 PG — SIGNIFICANT CHANGE UP (ref 27–34)
MCHC RBC-ENTMCNC: 29.2 PG — SIGNIFICANT CHANGE UP (ref 27–34)
MCHC RBC-ENTMCNC: 31.8 GM/DL — LOW (ref 32–36)
MCHC RBC-ENTMCNC: 32.6 GM/DL — SIGNIFICANT CHANGE UP (ref 32–36)
MCV RBC AUTO: 88.6 FL — SIGNIFICANT CHANGE UP (ref 80–100)
MCV RBC AUTO: 91.8 FL — SIGNIFICANT CHANGE UP (ref 80–100)
NRBC # BLD: 0 /100 WBCS — SIGNIFICANT CHANGE UP (ref 0–0)
NRBC # BLD: 0 /100 WBCS — SIGNIFICANT CHANGE UP (ref 0–0)
PLATELET # BLD AUTO: 158 K/UL — SIGNIFICANT CHANGE UP (ref 150–400)
PLATELET # BLD AUTO: 172 K/UL — SIGNIFICANT CHANGE UP (ref 150–400)
POTASSIUM SERPL-MCNC: 3.8 MMOL/L — SIGNIFICANT CHANGE UP (ref 3.5–5.3)
POTASSIUM SERPL-SCNC: 3.8 MMOL/L — SIGNIFICANT CHANGE UP (ref 3.5–5.3)
RBC # BLD: 2.73 M/UL — LOW (ref 3.8–5.2)
RBC # BLD: 2.81 M/UL — LOW (ref 3.8–5.2)
RBC # FLD: 13.6 % — SIGNIFICANT CHANGE UP (ref 10.3–14.5)
RBC # FLD: 14.1 % — SIGNIFICANT CHANGE UP (ref 10.3–14.5)
SODIUM SERPL-SCNC: 136 MMOL/L — SIGNIFICANT CHANGE UP (ref 135–145)
TSH SERPL-MCNC: 0.19 UIU/ML — LOW (ref 0.27–4.2)
VIT B12 SERPL-MCNC: 280 PG/ML — SIGNIFICANT CHANGE UP (ref 232–1245)
WBC # BLD: 6.28 K/UL — SIGNIFICANT CHANGE UP (ref 3.8–10.5)
WBC # BLD: 6.31 K/UL — SIGNIFICANT CHANGE UP (ref 3.8–10.5)
WBC # FLD AUTO: 6.28 K/UL — SIGNIFICANT CHANGE UP (ref 3.8–10.5)
WBC # FLD AUTO: 6.31 K/UL — SIGNIFICANT CHANGE UP (ref 3.8–10.5)

## 2022-11-10 PROCEDURE — 99223 1ST HOSP IP/OBS HIGH 75: CPT | Mod: GC

## 2022-11-10 RX ADMIN — Medication 650 MILLIGRAM(S): at 19:37

## 2022-11-10 RX ADMIN — Medication 650 MILLIGRAM(S): at 19:07

## 2022-11-10 RX ADMIN — SODIUM CHLORIDE 100 MILLILITER(S): 9 INJECTION, SOLUTION INTRAVENOUS at 00:10
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
## 2022-11-11 LAB
HCG SERPL-ACNC: 2667 MIU/ML — HIGH
HCT VFR BLD CALC: 23.1 % — LOW (ref 34.5–45)
HGB BLD-MCNC: 8 G/DL — LOW (ref 11.5–15.5)
MCHC RBC-ENTMCNC: 31 PG — SIGNIFICANT CHANGE UP (ref 27–34)
MCHC RBC-ENTMCNC: 34.6 GM/DL — SIGNIFICANT CHANGE UP (ref 32–36)
MCV RBC AUTO: 89.5 FL — SIGNIFICANT CHANGE UP (ref 80–100)
NRBC # BLD: 0 /100 WBCS — SIGNIFICANT CHANGE UP (ref 0–0)
PLATELET # BLD AUTO: 163 K/UL — SIGNIFICANT CHANGE UP (ref 150–400)
RBC # BLD: 2.58 M/UL — LOW (ref 3.8–5.2)
RBC # FLD: 14.1 % — SIGNIFICANT CHANGE UP (ref 10.3–14.5)
WBC # BLD: 4 K/UL — SIGNIFICANT CHANGE UP (ref 3.8–10.5)
WBC # FLD AUTO: 4 K/UL — SIGNIFICANT CHANGE UP (ref 3.8–10.5)

## 2022-11-11 PROCEDURE — 72197 MRI PELVIS W/O & W/DYE: CPT | Mod: 26

## 2022-11-11 PROCEDURE — 71250 CT THORAX DX C-: CPT | Mod: 26

## 2022-11-11 PROCEDURE — 99232 SBSQ HOSP IP/OBS MODERATE 35: CPT

## 2022-11-11 PROCEDURE — 93976 VASCULAR STUDY: CPT | Mod: 26

## 2022-11-11 PROCEDURE — 76830 TRANSVAGINAL US NON-OB: CPT | Mod: 26

## 2022-11-11 RX ORDER — CHLORHEXIDINE GLUCONATE 213 G/1000ML
1 SOLUTION TOPICAL
Refills: 0 | Status: DISCONTINUED | OUTPATIENT
Start: 2022-11-11 | End: 2022-11-13

## 2022-11-11 RX ORDER — INFLUENZA VIRUS VACCINE 15; 15; 15; 15 UG/.5ML; UG/.5ML; UG/.5ML; UG/.5ML
0.5 SUSPENSION INTRAMUSCULAR ONCE
Refills: 0 | Status: COMPLETED | OUTPATIENT
Start: 2022-11-11 | End: 2022-11-11

## 2022-11-11 RX ADMIN — Medication 650 MILLIGRAM(S): at 22:28

## 2022-11-11 RX ADMIN — CHLORHEXIDINE GLUCONATE 1 APPLICATION(S): 213 SOLUTION TOPICAL at 12:43

## 2022-11-11 NOTE — PROGRESS NOTE ADULT - SUBJECTIVE AND OBJECTIVE BOX
[facility] Division of Hospital Medicine  Zac Amin MD  Available via MS Teams      SUBJECTIVE / OVERNIGHT EVENTS:  No acute events overnight. She denies HA, dizziness, CP/SOB, N/V, fevers/chills     ADDITIONAL REVIEW OF SYSTEMS:  REVIEW OF SYSTEMS:    CONSTITUTIONAL: No weakness, fevers or chills  EYES: no blurry vision or eye pain.   ENT: No throat pain. No dysphagia.    NECK: No pain or stiffness  RESPIRATORY: No cough, wheezing, hemoptysis; No shortness of breath  CARDIOVASCULAR: No chest pain or palpitations.  GASTROINTESTINAL: No abdominal pain. No nausea or vomiting; No diarrhea or constipation. No melena or hematochezia.  GENITOURINARY: No dysuria, frequency or hematuria  NEUROLOGICAL: No numbness or weakness. No dizziness or falls.   SKIN: No itching, burning, rashes, or lesions.   LYMPHATIC: No masses or swelling.   All other review of systems is negative unless indicated above.  MEDICATIONS  (STANDING):  chlorhexidine 2% Cloths 1 Application(s) Topical <User Schedule>  lactated ringers. 1000 milliLiter(s) (100 mL/Hr) IV Continuous <Continuous>    MEDICATIONS  (PRN):  acetaminophen     Tablet .. 650 milliGRAM(s) Oral every 6 hours PRN Temp greater or equal to 38C (100.4F), Mild Pain (1 - 3)  aluminum hydroxide/magnesium hydroxide/simethicone Suspension 30 milliLiter(s) Oral every 4 hours PRN Dyspepsia  melatonin 3 milliGRAM(s) Oral at bedtime PRN Insomnia  ondansetron Injectable 4 milliGRAM(s) IV Push every 8 hours PRN Nausea and/or Vomiting      I&O's Summary    10 Nov 2022 07:01  -  11 Nov 2022 07:00  --------------------------------------------------------  IN: 1000 mL / OUT: 0 mL / NET: 1000 mL        PHYSICAL EXAM:  Vital Signs Last 24 Hrs  T(C): 36.7 (11 Nov 2022 10:59), Max: 36.8 (10 Nov 2022 16:46)  T(F): 98 (11 Nov 2022 10:59), Max: 98.2 (10 Nov 2022 16:46)  HR: 81 (11 Nov 2022 10:59) (80 - 89)  BP: 116/78 (11 Nov 2022 10:59) (105/73 - 121/77)  BP(mean): --  RR: 18 (11 Nov 2022 10:59) (16 - 18)  SpO2: 98% (11 Nov 2022 10:59) (96% - 98%)    Parameters below as of 11 Nov 2022 10:59  Patient On (Oxygen Delivery Method): room air      CONSTITUTIONAL: NAD, well-developed, well-groomed  EYES: PERRLA; conjunctiva and sclera clear  ENMT: Moist oral mucosa, no pharyngeal injection or exudates; normal dentition  NECK: Supple, no palpable masses; no thyromegaly  RESPIRATORY: Normal respiratory effort; lungs are clear to auscultation bilaterally  CARDIOVASCULAR: Regular rate and rhythm, normal S1 and S2, no murmur/rub/gallop; No lower extremity edema; Peripheral pulses are 2+ bilaterally  ABDOMEN: Nontender to palpation, normoactive bowel sounds, no rebound/guarding; No hepatosplenomegaly  MUSCULOSKELETAL:  Normal gait; no clubbing or cyanosis of digits; no joint swelling or tenderness to palpation  PSYCH: A+O to person, place, and time; affect appropriate  NEUROLOGY: CN 2-12 are intact and symmetric; no gross sensory deficits   SKIN: No rashes; no palpable lesions    LABS:                        8.0    4.00  )-----------( 163      ( 11 Nov 2022 06:11 )             23.1     11-10    136  |  104  |  14  ----------------------------<  114<H>  3.8   |  23  |  0.71    Ca    8.4      10 Nov 2022 07:25    TPro  7.0  /  Alb  4.3  /  TBili  0.5  /  DBili  x   /  AST  20  /  ALT  18  /  AlkPhos  43  11-09    PT/INR - ( 09 Nov 2022 16:47 )   PT: 13.6 sec;   INR: 1.17 ratio         PTT - ( 09 Nov 2022 16:47 )  PTT:28.2 sec          COVID-19 PCR: NotDetec (09 Nov 2022 18:18)      RADIOLOGY & ADDITIONAL TESTS:  ekg, labs, micro imaging personally reviewed      COORDINATION OF CARE:  care discussed and coordinated with consultants.

## 2022-11-11 NOTE — PATIENT PROFILE ADULT - FALL HARM RISK - UNIVERSAL INTERVENTIONS
Bed in lowest position, wheels locked, appropriate side rails in place/Call bell, personal items and telephone in reach/Instruct patient to call for assistance before getting out of bed or chair/Non-slip footwear when patient is out of bed/[place] to call system/Physically safe environment - no spills, clutter or unnecessary equipment/Purposeful Proactive Rounding/Room/bathroom lighting operational, light cord in reach

## 2022-11-11 NOTE — CHART NOTE - NSCHARTNOTEFT_GEN_A_CORE
Reviewed imaging findings as below:   < from: CT Chest No Cont (11.11.22 @ 11:56) >    FINDINGS:    LUNGS AND AIRWAYS: Patent central airways.  Lungs are clear.  PLEURA: No pleural effusion.  MEDIASTINUM AND SAVITA: No lymphadenopathy.  VESSELS: Within normal limits.  HEART: Heart size is normal. No pericardial effusion. Relative   hypodensityof the blood pool relative to the myocardium, which can be   seen in the setting of anemia.  CHEST WALL AND LOWER NECK: Within normal limits.  VISUALIZED UPPER ABDOMEN: Within normal limits.  BONES: Within normal limits.    IMPRESSION:  Clear lungs.        --- End of Report ---            PEPITO HOU MD; Resident Radiologist  This document has been electronically signed.  ZHANE ROSSI MD; Attending Radiologist  This document has been electronically signed. Nov 11 2022 12:09PM    < end of copied text >          < from: US Transvaginal (11.11.22 @ 12:15) >    FINDINGS:  Uterus: The uterus is retroverted, measuring 8.9 cm in length by 4.1 cm   AP by 4.6 cm transversely. As noted on the prior study, there is a   degenerated fundal fibroid, measuring 2.0 x 1.9 x 2.5 cm.    Endometrium: 3.2 mm. There is no focal thickeningor increased   vascularity in the endometrial canal. A heterogeneous mass is seen at the   level of the cervix and lower uterine segment, measuring 3.8 x 2.8 x 2.5   cm. This is not seen on the prior pelvic sonogram from 6/1/2021. The mass   has scant vascularity and suggests hematoma. Small retained products   cannot be excluded.    Right ovary: 3.0 x 2.1 x 1.9 cm with small follicles.  Left ovary: 2.5 x 1.5 x 3.0 cm.    Blood flow is demonstrated in both ovaries.    Fluid: Trace    IMPRESSION:  Heterogeneous mass is seen at the level of the cervix and lower uterine   segment measuring 3.8 cm with scant vascularity. The findings suggest   hematoma. Small retained products cannot be excluded.    No adnexal masses seen. Trace free fluid.    --- End of Report ---      JOSE BROOKS MD; Attending Radiologist  This document has been electronically signed. Nov 11 2022  2:16PM    < end of copied text >        TVUS read slightly different than previous. Concern that potential "degenerating fibroid" potentially retained POC.   - Awaiting MRI results.  - Would consider D&C vs Dx LSC based on results if concern for retained POC vs mole vs ectopic with MRI results  - CT scan negative for lesions/mets  - Working diagnosis remains retained POC vs molar pregnancy vs ectopic  - Awaiting [facility] medical records for further information     d/w Dr. Brothers and Dr. Telly Cai PGY-4 Reviewed imaging findings as below:   < from: CT Chest No Cont (11.11.22 @ 11:56) >    FINDINGS:    LUNGS AND AIRWAYS: Patent central airways.  Lungs are clear.  PLEURA: No pleural effusion.  MEDIASTINUM AND SAVITA: No lymphadenopathy.  VESSELS: Within normal limits.  HEART: Heart size is normal. No pericardial effusion. Relative   hypodensityof the blood pool relative to the myocardium, which can be   seen in the setting of anemia.  CHEST WALL AND LOWER NECK: Within normal limits.  VISUALIZED UPPER ABDOMEN: Within normal limits.  BONES: Within normal limits.    IMPRESSION:  Clear lungs.    --- End of Report ---    PEPITO HOU MD; Resident Radiologist  This document has been electronically signed.  ZHANE ROSSI MD; Attending Radiologist  This document has been electronically signed. Nov 11 2022 12:09PM    < end of copied text >      < from: US Transvaginal (11.11.22 @ 12:15) >    FINDINGS:  Uterus: The uterus is retroverted, measuring 8.9 cm in length by 4.1 cm   AP by 4.6 cm transversely. As noted on the prior study, there is a   degenerated fundal fibroid, measuring 2.0 x 1.9 x 2.5 cm.    Endometrium: 3.2 mm. There is no focal thickeningor increased   vascularity in the endometrial canal. A heterogeneous mass is seen at the   level of the cervix and lower uterine segment, measuring 3.8 x 2.8 x 2.5   cm. This is not seen on the prior pelvic sonogram from 6/1/2021. The mass   has scant vascularity and suggests hematoma. Small retained products   cannot be excluded.    Right ovary: 3.0 x 2.1 x 1.9 cm with small follicles.  Left ovary: 2.5 x 1.5 x 3.0 cm.    Blood flow is demonstrated in both ovaries.    Fluid: Trace    IMPRESSION:  Heterogeneous mass is seen at the level of the cervix and lower uterine   segment measuring 3.8 cm with scant vascularity. The findings suggest   hematoma. Small retained products cannot be excluded.    No adnexal masses seen. Trace free fluid.    --- End of Report ---      JOSE BROOKS MD; Attending Radiologist  This document has been electronically signed. Nov 11 2022  2:16PM    < end of copied text       TVUS read slightly different than previous. Concern that potential "degenerating fibroid" potentially retained POC.   - Awaiting MRI results.  - Would consider D&C vs Dx LSC based on results if concern for retained POC vs mole vs ectopic with MRI results  - CT scan negative for lesions/mets  - Working diagnosis remains retained POC vs molar pregnancy vs ectopic  - Awaiting [facility] medical records for further information     d/w Dr. Brothers and Dr. Telly Cai PGY-4      addendum:    Patient seen at bedside this morning. Explained concern for possible retained POC vs molar pregnancy/GTN vs ectopic 2/2 abnormal plateau'd beta after D&C. Explained that we are awaiting records to help us understand what was seen on her initial imaging at [facility]. Here there TVUS read has changed from prior with possible avascular debris in the cavity. No concerns for metastasis on chest/brain imaging for possible GTN. Explained most likely patient has retained POC but cannot rule out other possibilities yet. Awaiting MRI abdomen pelvis. She otherwise feels well currently. Bleeding has decreased and she has rare cramping. She denies visual or neuro disturbances currently. This was an unplanned but desired pregnancy. She states that since she had her procedure she has had no intercourse.    She is well appearing, NAD  abd soft, NT  pad less than half soaked and changed over an hour ago  labs and VS reviewed: H/H stable. bHCG pleateau'd    If patient has significant hemorrhage or acute abdomen please page #[phone]  Awaiting MRI for further evaluation of uterus  consider D&C vs dx lsc based on results    DIONNE Brothers MD

## 2022-11-11 NOTE — CHART NOTE - NSCHARTNOTEFT_GEN_A_CORE
Patient noted to be have abnormally rising/plateauing bHCG trend:  2767 (11/9)->2153 (11/10)->2667 (11/11).     A/P: c/f molar pregnancy vs heterotopic pregnancy in setting of s/p D&C 10/22 with continued bleeding, abnormal beta trend. Less likely retained POC given TVUS read. Additionally no evidence of adnexal masses c/f ectopic at this time.     - repeat TVUS (please order URGENT)  - CT Chest to r/o mets to lungs w/ c/f molar pregnancy  - MRI Abd/Pelvis to r/o heterotopic pregnancy     GYN to continue following closely  d/w Dr. Telly Cai PGY-4

## 2022-11-12 LAB
ANION GAP SERPL CALC-SCNC: 7 MMOL/L — SIGNIFICANT CHANGE UP (ref 5–17)
BUN SERPL-MCNC: 8 MG/DL — SIGNIFICANT CHANGE UP (ref 7–23)
CALCIUM SERPL-MCNC: 8.4 MG/DL — SIGNIFICANT CHANGE UP (ref 8.4–10.5)
CHLORIDE SERPL-SCNC: 106 MMOL/L — SIGNIFICANT CHANGE UP (ref 96–108)
CO2 SERPL-SCNC: 27 MMOL/L — SIGNIFICANT CHANGE UP (ref 22–31)
CREAT SERPL-MCNC: 0.77 MG/DL — SIGNIFICANT CHANGE UP (ref 0.5–1.3)
EGFR: 103 ML/MIN/1.73M2 — SIGNIFICANT CHANGE UP
GLUCOSE SERPL-MCNC: 95 MG/DL — SIGNIFICANT CHANGE UP (ref 70–99)
HCG SERPL-ACNC: 2360 MIU/ML — HIGH
HCT VFR BLD CALC: 22.3 % — LOW (ref 34.5–45)
HGB BLD-MCNC: 7.1 G/DL — LOW (ref 11.5–15.5)
MCHC RBC-ENTMCNC: 29.6 PG — SIGNIFICANT CHANGE UP (ref 27–34)
MCHC RBC-ENTMCNC: 31.8 GM/DL — LOW (ref 32–36)
MCV RBC AUTO: 92.9 FL — SIGNIFICANT CHANGE UP (ref 80–100)
MRSA PCR RESULT.: SIGNIFICANT CHANGE UP
NRBC # BLD: 0 /100 WBCS — SIGNIFICANT CHANGE UP (ref 0–0)
PLATELET # BLD AUTO: 141 K/UL — LOW (ref 150–400)
POTASSIUM SERPL-MCNC: 3.7 MMOL/L — SIGNIFICANT CHANGE UP (ref 3.5–5.3)
POTASSIUM SERPL-SCNC: 3.7 MMOL/L — SIGNIFICANT CHANGE UP (ref 3.5–5.3)
RBC # BLD: 2.4 M/UL — LOW (ref 3.8–5.2)
RBC # FLD: 13.6 % — SIGNIFICANT CHANGE UP (ref 10.3–14.5)
S AUREUS DNA NOSE QL NAA+PROBE: SIGNIFICANT CHANGE UP
SODIUM SERPL-SCNC: 140 MMOL/L — SIGNIFICANT CHANGE UP (ref 135–145)
WBC # BLD: 4.15 K/UL — SIGNIFICANT CHANGE UP (ref 3.8–10.5)
WBC # FLD AUTO: 4.15 K/UL — SIGNIFICANT CHANGE UP (ref 3.8–10.5)

## 2022-11-12 PROCEDURE — 99233 SBSQ HOSP IP/OBS HIGH 50: CPT

## 2022-11-12 RX ORDER — SUMATRIPTAN SUCCINATE 4 MG/.5ML
25 INJECTION, SOLUTION SUBCUTANEOUS ONCE
Refills: 0 | Status: COMPLETED | OUTPATIENT
Start: 2022-11-12 | End: 2022-11-12

## 2022-11-12 RX ORDER — METHOTREXATE 2.5 MG/1
100 TABLET ORAL ONCE
Refills: 0 | Status: COMPLETED | OUTPATIENT
Start: 2022-11-12 | End: 2022-11-12

## 2022-11-12 RX ORDER — POTASSIUM CHLORIDE 20 MEQ
40 PACKET (EA) ORAL ONCE
Refills: 0 | Status: COMPLETED | OUTPATIENT
Start: 2022-11-12 | End: 2022-11-12

## 2022-11-12 RX ADMIN — Medication 40 MILLIEQUIVALENT(S): at 08:56

## 2022-11-12 RX ADMIN — Medication 650 MILLIGRAM(S): at 07:46

## 2022-11-12 RX ADMIN — Medication 650 MILLIGRAM(S): at 08:30

## 2022-11-12 RX ADMIN — Medication 650 MILLIGRAM(S): at 02:45

## 2022-11-12 RX ADMIN — Medication 650 MILLIGRAM(S): at 16:44

## 2022-11-12 RX ADMIN — Medication 650 MILLIGRAM(S): at 17:15

## 2022-11-12 RX ADMIN — SUMATRIPTAN SUCCINATE 25 MILLIGRAM(S): 4 INJECTION, SOLUTION SUBCUTANEOUS at 17:23

## 2022-11-12 RX ADMIN — SUMATRIPTAN SUCCINATE 25 MILLIGRAM(S): 4 INJECTION, SOLUTION SUBCUTANEOUS at 18:00

## 2022-11-12 RX ADMIN — METHOTREXATE 100 MILLIGRAM(S): 2.5 TABLET ORAL at 23:44

## 2022-11-12 NOTE — CHART NOTE - NSCHARTNOTESELECT_GEN_ALL_CORE
GYN CHART NOTE
GYN Chart Note
Medicine
[facility] collateral
R4 Chart Note - Gyn
R4 GYN Chart Note
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
## 2022-11-12 NOTE — CHART NOTE - NSCHARTNOTEFT_GEN_A_CORE
PA Note    RN called for patient c/o migraine.  Pt states she has had a headache since last night. She took Tylenol without relief. Pt took Tylenol again today and still no relief.  Patient states she does get these at home and she will take either Motrin or Excedrin.   Patient denies any Aura, nausea or vomiting associated. Denies any visual disturbances as well.    Ordered Imitrex 25mg PO x1  Vitals stable -> /77    Carline Llody, PAC

## 2022-11-12 NOTE — CHART NOTE - NSCHARTNOTEFT_GEN_A_CORE
Spoke with [facility] surgeon at 8am this morning regarding patient's preoperative, intraoperative and postoperative course. [facility]. Patient was 8weeks GA based on LMP though a CRL measuring 6w2d was observed on US prior to procedure. No B-HCG was collected prior to procedure. D+C was reportedly uncomplicated with no acute concerns by provider. Products of conception such as villi and gestational sac were observed at the close of the procedure and no fetal parts were seen. POCs are not routinely sent to pathology for confirmation at [facility] and so there is no pathology report from the procedure. The patient did not follow up postoperatively with [facility].    Patient status was discussed with the surgeon who performed her procedure, Dr. Dugan.    Awaiting MRI A/P read for further recommendations concerning patient's plateauing beta hcg.     Joanne Martin, PGY2

## 2022-11-12 NOTE — PROGRESS NOTE ADULT - ASSESSMENT
36 yo , LMP end of Aug with no PMH present for heavy vaginal bleed in the setting recent D&C few weeks ago admitted for further gynecologic work-up with concern for retained POC vs. molar pregnancy vs. ectopic pregnancy pending MRI results.

## 2022-11-12 NOTE — PROGRESS NOTE ADULT - NSPROGADDITIONALINFOA_GEN_ALL_CORE
.  Salome Khan MD  Division of Hospital Medicine  [facility]   Available on [employer] Teams - messages preferred prior to calls.    Plan discussed with patient and medicine HAYLIE Crowley.
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
## 2022-11-12 NOTE — PROGRESS NOTE ADULT - SUBJECTIVE AND OBJECTIVE BOX
Salome Khan MD  Division of Hospital Medicine  [facility]   Available on [employer] Teams (Mon-Fri 8am-5pm)    * messages preferred prior to calls  Other Times:  [phone]      Patient is a 37y old  Female who presents with a chief complaint of Vaginal bleed (12 Nov 2022 07:50)      SUBJECTIVE / OVERNIGHT EVENTS: no acute events overnight. no fever, chills, chest pain, lightheadedness, dizziness. vaginal bleeding slowing down since admission - soaking through approx 2 pads/day per patient,  mild abd cramping intermittently but otherwise doing okay.  ADDITIONAL REVIEW OF SYSTEMS:    MEDICATIONS  (STANDING):  chlorhexidine 2% Cloths 1 Application(s) Topical <User Schedule>  lactated ringers. 1000 milliLiter(s) (100 mL/Hr) IV Continuous <Continuous>    MEDICATIONS  (PRN):  acetaminophen     Tablet .. 650 milliGRAM(s) Oral every 6 hours PRN Temp greater or equal to 38C (100.4F), Mild Pain (1 - 3)  aluminum hydroxide/magnesium hydroxide/simethicone Suspension 30 milliLiter(s) Oral every 4 hours PRN Dyspepsia  melatonin 3 milliGRAM(s) Oral at bedtime PRN Insomnia  ondansetron Injectable 4 milliGRAM(s) IV Push every 8 hours PRN Nausea and/or Vomiting      CAPILLARY BLOOD GLUCOSE        I&O's Summary    11 Nov 2022 07:01  -  12 Nov 2022 07:00  --------------------------------------------------------  IN: 960 mL / OUT: 0 mL / NET: 960 mL    12 Nov 2022 07:01  -  12 Nov 2022 14:08  --------------------------------------------------------  IN: 1040 mL / OUT: 0 mL / NET: 1040 mL        PHYSICAL EXAM:  Vital Signs Last 24 Hrs  T(C): 36.8 (12 Nov 2022 10:59), Max: 36.8 (12 Nov 2022 10:59)  T(F): 98.3 (12 Nov 2022 10:59), Max: 98.3 (12 Nov 2022 10:59)  HR: 98 (12 Nov 2022 10:59) (85 - 99)  BP: 120/67 (12 Nov 2022 10:59) (111/71 - 123/69)  BP(mean): --  RR: 18 (12 Nov 2022 10:59) (18 - 18)  SpO2: 100% (12 Nov 2022 10:59) (97% - 100%)    Parameters below as of 12 Nov 2022 10:59  Patient On (Oxygen Delivery Method): room air        CONSTITUTIONAL: NAD, well-developed, well-groomed  EYES: PERRLA; conjunctiva and sclera clear  ENMT: Moist oral mucosa, no pharyngeal injection or exudates; normal dentition  NECK: Supple, no palpable masses; no thyromegaly  RESPIRATORY: Normal respiratory effort; lungs are clear to auscultation bilaterally  CARDIOVASCULAR: Regular rate and rhythm, normal S1 and S2, no murmur/rub/gallop; No lower extremity edema; Peripheral pulses are 2+ bilaterally  ABDOMEN: Soft, Nondistended, +tender to deep palpation in LLQ>RLQ normoactive bowel sounds  MUSCULOSKELETAL:  No clubbing or cyanosis of digits; no joint swelling or tenderness to palpation  PSYCH: A+O to person, place, and time; affect appropriate  NEUROLOGY: CN 2-12 are intact and symmetric; no gross sensory deficits   SKIN: No rashes; no palpable lesions    LABS:                        7.1    4.15  )-----------( 141      ( 12 Nov 2022 05:37 )             22.3     11-12    140  |  106  |  8   ----------------------------<  95  3.7   |  27  |  0.77    Ca    8.4      12 Nov 2022 05:36          RADIOLOGY & ADDITIONAL TESTS:  Results Reviewed:   Imaging Personally Reviewed:  11/11/22 CT Chest:  FINDINGS:    LUNGS AND AIRWAYS: Patent central airways.  Lungs are clear.  PLEURA: No pleural effusion.  MEDIASTINUM AND SAVITA: No lymphadenopathy.  VESSELS: Within normal limits.  HEART: Heart size is normal. No pericardial effusion. Relative   hypodensity of the blood pool relative to the myocardium, which can be   seen in the setting of anemia.  CHEST WALL AND LOWER NECK: Within normal limits.  VISUALIZED UPPER ABDOMEN: Within normal limits.  BONES: Within normal limits.    IMPRESSION:  Clear lungs.    11/11/22 Abd Duplex + TVUS:  IMPRESSION:  Heterogeneous mass is seen at the level of the cervix and lower uterine   segment measuring 3.8 cm with scant vascularity. The findings suggest   hematoma. Small retained products cannot be excluded.    No adnexal masses seen. Trace free fluid.    Electrocardiogram Personally Reviewed:    COORDINATION OF CARE:  Care Discussed with Consultants/Other Providers [Y]: medicine HAYLIE Ribeiro  Prior or Outpatient Records Reviewed [Y/N]:

## 2022-11-12 NOTE — PROGRESS NOTE ADULT - ASSESSMENT
A/P: 36y/o HD#4   s/p .  Patient is stable and doing well.      Neuro: PO pain meds.   CV: Hemodynamically stable  Pulm: Saturating well on room air, encourage oob/amb  GI: Advance to regular diet vs. Continue regular diet  : UOP adequate, Voiding spontaneously   Heme: c/w HSQ and SCDs for DVT ppx  FEN: LR@125.  replete electrolytes prn   ID: Afebrile  Endo: No active issues   Dispo: Continue routine post-op care    Joanne Martin, PGY2 A/P: 36y/o POD#21 HD#4 p/w VB and AMS s/p D&C for TOP on 10/22/22.  Patient is stable and doing well this AM with minimal bleeding. HCG stable 2360 this AM. Concern forpossible retained POC vs molar pregnancy/GTN vs ectopic 2/2 abnormal plateau'd beta after D&C. f/u [facility] records from [place].    Neuro: Patient A&O x4 this AM. PO pain meds.   -CT Head (11/9): No acute intracranial pathology.  CV: Hemodynamically stable  Pulm: Saturating well on room air, encourage oob/amb  -CT Chest (11/11): Clear lungs.  GI: Continue regular diet  : UOP adequate, Voiding spontaneously, concern for   -TVUS(11/9): Normal endometrial thickness measuring 4 mm without hypervascularity. No findings to suggest retained products of conception. Uterine fibroids, the largest measuring up to 4 cm in the lower uterine segment. Unremarkable ovaries.  -TVUS(11/11): Heterogeneous mass is seen at the level of the cervix and lower uterine segment measuring 3.8 cm with scant vascularity. The findings suggest hematoma. Small retained products cannot be excluded. No adnexal masses seen. Trace free fluid.  -f/u MRI A/P read (11/11)  Heme: Hemodynamically stable this AM. Hgb 7.1, though patient denies symptoms related to anemia c/w ambulation for DVT ppx  FEN: LR@100. replete electrolytes prn   ID: Afebrile, WBC wnl this AM  Endo: No active issues   Dispo: Continue routine inpatient care, plan pending discussion with attending and [facility].    Joanne Martin, PGY2

## 2022-11-13 ENCOUNTER — TRANSCRIPTION ENCOUNTER (OUTPATIENT)
Age: 37
End: 2022-11-13

## 2022-11-13 VITALS
TEMPERATURE: 98 F | HEART RATE: 71 BPM | RESPIRATION RATE: 18 BRPM | OXYGEN SATURATION: 96 % | SYSTOLIC BLOOD PRESSURE: 117 MMHG | DIASTOLIC BLOOD PRESSURE: 65 MMHG

## 2022-11-13 DIAGNOSIS — R51.9 HEADACHE, UNSPECIFIED: ICD-10-CM

## 2022-11-13 LAB
ANION GAP SERPL CALC-SCNC: 8 MMOL/L — SIGNIFICANT CHANGE UP (ref 5–17)
BUN SERPL-MCNC: 6 MG/DL — LOW (ref 7–23)
CALCIUM SERPL-MCNC: 8.8 MG/DL — SIGNIFICANT CHANGE UP (ref 8.4–10.5)
CHLORIDE SERPL-SCNC: 105 MMOL/L — SIGNIFICANT CHANGE UP (ref 96–108)
CO2 SERPL-SCNC: 26 MMOL/L — SIGNIFICANT CHANGE UP (ref 22–31)
CREAT SERPL-MCNC: 0.82 MG/DL — SIGNIFICANT CHANGE UP (ref 0.5–1.3)
EGFR: 96 ML/MIN/1.73M2 — SIGNIFICANT CHANGE UP
GLUCOSE SERPL-MCNC: 95 MG/DL — SIGNIFICANT CHANGE UP (ref 70–99)
HCT VFR BLD CALC: 22.6 % — LOW (ref 34.5–45)
HGB BLD-MCNC: 7.2 G/DL — LOW (ref 11.5–15.5)
MCHC RBC-ENTMCNC: 29.4 PG — SIGNIFICANT CHANGE UP (ref 27–34)
MCHC RBC-ENTMCNC: 31.9 GM/DL — LOW (ref 32–36)
MCV RBC AUTO: 92.2 FL — SIGNIFICANT CHANGE UP (ref 80–100)
NRBC # BLD: 0 /100 WBCS — SIGNIFICANT CHANGE UP (ref 0–0)
PLATELET # BLD AUTO: 155 K/UL — SIGNIFICANT CHANGE UP (ref 150–400)
POTASSIUM SERPL-MCNC: 4.3 MMOL/L — SIGNIFICANT CHANGE UP (ref 3.5–5.3)
POTASSIUM SERPL-SCNC: 4.3 MMOL/L — SIGNIFICANT CHANGE UP (ref 3.5–5.3)
RBC # BLD: 2.45 M/UL — LOW (ref 3.8–5.2)
RBC # FLD: 13.8 % — SIGNIFICANT CHANGE UP (ref 10.3–14.5)
SODIUM SERPL-SCNC: 139 MMOL/L — SIGNIFICANT CHANGE UP (ref 135–145)
WBC # BLD: 4.18 K/UL — SIGNIFICANT CHANGE UP (ref 3.8–10.5)
WBC # FLD AUTO: 4.18 K/UL — SIGNIFICANT CHANGE UP (ref 3.8–10.5)

## 2022-11-13 PROCEDURE — 86870 RBC ANTIBODY IDENTIFICATION: CPT

## 2022-11-13 PROCEDURE — 36415 COLL VENOUS BLD VENIPUNCTURE: CPT

## 2022-11-13 PROCEDURE — 87640 STAPH A DNA AMP PROBE: CPT

## 2022-11-13 PROCEDURE — 85018 HEMOGLOBIN: CPT

## 2022-11-13 PROCEDURE — 70450 CT HEAD/BRAIN W/O DYE: CPT | Mod: 26

## 2022-11-13 PROCEDURE — U0003: CPT

## 2022-11-13 PROCEDURE — 87641 MR-STAPH DNA AMP PROBE: CPT

## 2022-11-13 PROCEDURE — 85014 HEMATOCRIT: CPT

## 2022-11-13 PROCEDURE — 84132 ASSAY OF SERUM POTASSIUM: CPT

## 2022-11-13 PROCEDURE — 84702 CHORIONIC GONADOTROPIN TEST: CPT

## 2022-11-13 PROCEDURE — 82962 GLUCOSE BLOOD TEST: CPT

## 2022-11-13 PROCEDURE — 86901 BLOOD TYPING SEROLOGIC RH(D): CPT

## 2022-11-13 PROCEDURE — P9016: CPT

## 2022-11-13 PROCEDURE — 85460 HEMOGLOBIN FETAL: CPT

## 2022-11-13 PROCEDURE — 76817 TRANSVAGINAL US OBSTETRIC: CPT

## 2022-11-13 PROCEDURE — 82947 ASSAY GLUCOSE BLOOD QUANT: CPT

## 2022-11-13 PROCEDURE — 71250 CT THORAX DX C-: CPT

## 2022-11-13 PROCEDURE — 84207 ASSAY OF VITAMIN B-6: CPT

## 2022-11-13 PROCEDURE — 99285 EMERGENCY DEPT VISIT HI MDM: CPT

## 2022-11-13 PROCEDURE — 82330 ASSAY OF CALCIUM: CPT

## 2022-11-13 PROCEDURE — 82607 VITAMIN B-12: CPT

## 2022-11-13 PROCEDURE — 80053 COMPREHEN METABOLIC PANEL: CPT

## 2022-11-13 PROCEDURE — 76830 TRANSVAGINAL US NON-OB: CPT

## 2022-11-13 PROCEDURE — 82803 BLOOD GASES ANY COMBINATION: CPT

## 2022-11-13 PROCEDURE — 85027 COMPLETE CBC AUTOMATED: CPT

## 2022-11-13 PROCEDURE — 85520 HEPARIN ASSAY: CPT

## 2022-11-13 PROCEDURE — 84443 ASSAY THYROID STIM HORMONE: CPT

## 2022-11-13 PROCEDURE — 86923 COMPATIBILITY TEST ELECTRIC: CPT

## 2022-11-13 PROCEDURE — 70450 CT HEAD/BRAIN W/O DYE: CPT | Mod: MA

## 2022-11-13 PROCEDURE — 93975 VASCULAR STUDY: CPT

## 2022-11-13 PROCEDURE — 72197 MRI PELVIS W/O & W/DYE: CPT

## 2022-11-13 PROCEDURE — 82435 ASSAY OF BLOOD CHLORIDE: CPT

## 2022-11-13 PROCEDURE — 36430 TRANSFUSION BLD/BLD COMPNT: CPT

## 2022-11-13 PROCEDURE — 82746 ASSAY OF FOLIC ACID SERUM: CPT

## 2022-11-13 PROCEDURE — 80048 BASIC METABOLIC PNL TOTAL CA: CPT

## 2022-11-13 PROCEDURE — 99239 HOSP IP/OBS DSCHRG MGMT >30: CPT

## 2022-11-13 PROCEDURE — 93976 VASCULAR STUDY: CPT

## 2022-11-13 PROCEDURE — 83605 ASSAY OF LACTIC ACID: CPT

## 2022-11-13 PROCEDURE — 86900 BLOOD TYPING SEROLOGIC ABO: CPT

## 2022-11-13 PROCEDURE — 86077 PHYS BLOOD BANK SERV XMATCH: CPT

## 2022-11-13 PROCEDURE — 85025 COMPLETE CBC W/AUTO DIFF WBC: CPT

## 2022-11-13 PROCEDURE — 85730 THROMBOPLASTIN TIME PARTIAL: CPT

## 2022-11-13 PROCEDURE — A9585: CPT

## 2022-11-13 PROCEDURE — 84425 ASSAY OF VITAMIN B-1: CPT

## 2022-11-13 PROCEDURE — 84295 ASSAY OF SERUM SODIUM: CPT

## 2022-11-13 PROCEDURE — 85610 PROTHROMBIN TIME: CPT

## 2022-11-13 PROCEDURE — 86850 RBC ANTIBODY SCREEN: CPT

## 2022-11-13 RX ORDER — ACETAMINOPHEN 500 MG
3 TABLET ORAL
Qty: 0 | Refills: 0 | DISCHARGE
Start: 2022-11-13

## 2022-11-13 RX ORDER — ACETAMINOPHEN 500 MG
1000 TABLET ORAL ONCE
Refills: 0 | Status: DISCONTINUED | OUTPATIENT
Start: 2022-11-13 | End: 2022-11-13

## 2022-11-13 RX ORDER — ACETAMINOPHEN 500 MG
1000 TABLET ORAL ONCE
Refills: 0 | Status: COMPLETED | OUTPATIENT
Start: 2022-11-13 | End: 2022-11-13

## 2022-11-13 RX ORDER — LANOLIN ALCOHOL/MO/W.PET/CERES
1 CREAM (GRAM) TOPICAL
Qty: 0 | Refills: 0 | DISCHARGE
Start: 2022-11-13

## 2022-11-13 RX ADMIN — Medication 400 MILLIGRAM(S): at 07:27

## 2022-11-13 RX ADMIN — Medication 1000 MILLIGRAM(S): at 01:33

## 2022-11-13 RX ADMIN — Medication 400 MILLIGRAM(S): at 00:26

## 2022-11-13 RX ADMIN — Medication 1000 MILLIGRAM(S): at 08:14

## 2022-11-13 RX ADMIN — ONDANSETRON 4 MILLIGRAM(S): 8 TABLET, FILM COATED ORAL at 07:48

## 2022-11-13 NOTE — DISCHARGE NOTE NURSING/CASE MANAGEMENT/SOCIAL WORK - NSDCPEFALRISK_GEN_ALL_CORE
For information on Fall & Injury Prevention, visit: [email or web address] OR  [email or web address] OR  https://www.cdc.gov/steadi/patient.html

## 2022-11-13 NOTE — PROGRESS NOTE ADULT - PROBLEM SELECTOR PLAN 2
P/w acute heavy vag bleed i/s/o recent D&C apx 2wk ago   - In ED, experienced multiple brief episode of unresponsiveness   - Endorsed poor PO intake  - AMS possibly 2/2 hypovolemia from poor intake c/b vag bleed   - AMS now resolved. mentating well with no issues  - CT head: neg for intracranial pathology   - Apprec Neuro recs, no additional workup necessary but if recurs then consider MRI and/or EEG.
P/w acute heavy vag bleed i/s/o recent D&C apx 2wk ago   - In ED, experienced multiple brief episode of unresponsiveness   - Endorsed poor PO intake  - AMS possibly 2/2 hypovolemia from poor intake c/b vag bleed   - AMS now resolved. mentating well with no issues  - CT head: neg for intracranial pathology   - Apprec Neuro recs, no additional workup necessary but if recurs then consider MRI and/or EEG.  -pt at baseline mental status oriented x 4
P/w acute heavy vag bleed i/s/o recent D&C apx 2wk ago   - In ED, experienced multiple brief episode of unresponsiveness   - Endorse poor PO intake  - AMS possibly 2/2 hypovolemia from poor intake c/b vag bleed   - CT head: neg for intracranial pathology   - Apprec Neuro recs, no additional workup necessary but if recurs then consider MRI and/or EEG.

## 2022-11-13 NOTE — DISCHARGE NOTE PROVIDER - NSDCFUADDINST_GEN_ALL_CORE_FT
1. Please reestablish care at [facility] with Dr. EDI Jay - f/u apt on Tuesday 11/15.  Patient given strict precautions to call her physician or return to ED if she experiences any severe abdominal pain, dizziness, lightheadedness, severe n/v, or any heavy vaginal bleeding >2 pads/hour for >2 hours.    2. You need to follow up of b-hcg on day 4 and day 7 of methotrexate therapy.  Patient intends to follow up in the ED or GYN office on TUESDAY 11/15 and FRIDAY 11/18  3. Nothing in vagina (complete vaginal rest) no tampons, douching, tub baths, sex, fingers/toys. Showers are ok.

## 2022-11-13 NOTE — PROGRESS NOTE ADULT - SUBJECTIVE AND OBJECTIVE BOX
Patient is a 37y old  Female who presents with a chief complaint of Vaginal bleed (12 Nov 2022 14:08)      SUBJECTIVE / OVERNIGHT EVENTS: No overnight events. Feels well, No complaints. Says vaginal bleeding much better and now minimal.  Denies chest pain, sob, dizziness, palpitations, n/v. Able to walk without dizziness. had headache overnight which completely resolved. No focal weakness.  Really wants to go home today    Tele reviewed:       ADDITIONAL REVIEW OF SYSTEMS: Negative except for above    MEDICATIONS  (STANDING):  chlorhexidine 2% Cloths 1 Application(s) Topical <User Schedule>    MEDICATIONS  (PRN):  acetaminophen     Tablet .. 650 milliGRAM(s) Oral every 6 hours PRN Temp greater or equal to 38C (100.4F), Mild Pain (1 - 3)  acetaminophen   IVPB .. 1000 milliGRAM(s) IV Intermittent once PRN Mild Pain (1 - 3)  aluminum hydroxide/magnesium hydroxide/simethicone Suspension 30 milliLiter(s) Oral every 4 hours PRN Dyspepsia  melatonin 3 milliGRAM(s) Oral at bedtime PRN Insomnia  ondansetron Injectable 4 milliGRAM(s) IV Push every 8 hours PRN Nausea and/or Vomiting      CAPILLARY BLOOD GLUCOSE        I&O's Summary    12 Nov 2022 07:01  -  13 Nov 2022 07:00  --------------------------------------------------------  IN: 1040 mL / OUT: 0 mL / NET: 1040 mL        PHYSICAL EXAM:  Vital Signs Last 24 Hrs  T(C): 36.6 (13 Nov 2022 05:39), Max: 37.1 (12 Nov 2022 21:12)  T(F): 97.8 (13 Nov 2022 05:39), Max: 98.8 (12 Nov 2022 21:12)  HR: 63 (13 Nov 2022 05:39) (63 - 85)  BP: 124/68 (13 Nov 2022 05:39) (117/77 - 125/66)  BP(mean): --  RR: 18 (13 Nov 2022 05:39) (17 - 18)  SpO2: 96% (13 Nov 2022 05:39) (96% - 98%)    Parameters below as of 13 Nov 2022 05:39  Patient On (Oxygen Delivery Method): room air        PHYSICAL EXAM:  GENERAL: NAD, well-developed  HEAD:  Atraumatic, Normocephalic  EYES:  conjunctiva and sclera clear  NECK: Supple, No JVD  CHEST/LUNG: Clear to auscultation bilaterally;  HEART: Regular rate and rhythm;   ABDOMEN: Soft, Nontender, Nondistended; Bowel sounds present  EXTREMITIES:  2+ Peripheral Pulses, No clubbing, cyanosis, or edema  PSYCH: AAOx3  NEUROLOGY: non-focal        LABS:                        7.2    4.18  )-----------( 155      ( 13 Nov 2022 07:11 )             22.6     11-13    139  |  105  |  6<L>  ----------------------------<  95  4.3   |  26  |  0.82    Ca    8.8      13 Nov 2022 07:11                  RADIOLOGY & ADDITIONAL TESTS:    Imaging Personally Reviewed:  mr pelvis: IMPRESSION:  Mass in the anterior lower uterine segment appears centered in the   myometrium, possibly degenerating leiomyoma.    Electrocardiogram Personally Reviewed:    COORDINATION OF CARE:  Care Discussed with Consultants/Other Providers [Y/N]:  Prior or Outpatient Records Reviewed [Y/N]:

## 2022-11-13 NOTE — PROGRESS NOTE ADULT - PROBLEM SELECTOR PLAN 1
P/w acute heavy vag bleed i/s/o recent D&C apx 2wk ago   - In ED experienced multiple brief episode of unresponsiveness    - Was also noted to have acute drop in hg 9.8>> 8.3>>8.0  - Given 1u PRBC in ED, hemoglobin now 8.0  - Received Rhogam,   - TVUS obtained and neg for retained products of conception   - bhcg 2767 (11/9)->2153 (11/10)->2667 (11/11), concerns for heterotropic pregnancy vs molar pregnancy  - CT chest done: no metastatic disease  - MRI Abd/pelvis pending to r/o heterotropic pregnancy  - keep active T&S  - Cytotec 600mcg vaginal, to be performed by OB-Gyn   - F/u UA/ Ucx   - Apprec Ob-Gyn recs
acute heavy vag bleed i/s/o recent D&C apx 2wk ago   - In ED experienced multiple brief episode of unresponsiveness    - Was also noted to have acute drop in hg 9.8>> 8.3>>8.0  - Given 1u PRBC in ED, hemoglobin now stable in 7-8 range, remained stable in low 7s for at least 2 days with on sypmtoms  - Received Rhogam  - s/p Cytotec 600mcg vaginal performed by OB-Gyn   - TVUS obtained and neg for retained products of conception   - repeat TVUS on 11/11/22 changed with new heterogenous mass concerning for hematoma vs. retained POC  - b-hCG 2767 (11/9)->2153 (11/10)->2667 (11/11)-->2360 on 11/12 - no need to further trend  - CT chest done: no metastatic disease  - MRI Abd/pelvis: Mass in the anterior lower uterine segment appears centered in the myometrium, possibly degenerating leiomyoma.  -per GYN, given abnormal HCG Levels without any signs of retained POC on imaging, recommend mngt with MTX.  No clinical indication for surgical intervention - bleeding is improved and no evidence of retained POC on mulitple imaging modalities.  -pt was given methotrexate by gyn and will f/u on Day4 and Day 7 - [facility] EDI Jay - task sent for f/u apt on Tuesday 11/15
P/w acute heavy vag bleed i/s/o recent D&C apx 2wk ago   - In ED experienced multiple brief episode of unresponsiveness    - Was also noted to have acute drop in hg 9.8>> 8.3>>8.0  - Given 1u PRBC in ED, hemoglobin now stable in 7-8 range  - Received Rhogam  - s/p Cytotec 600mcg vaginal performed by OB-Gyn   - TVUS obtained and neg for retained products of conception   - repeat TVUS on 11/11/22 changed with new heterogenous mass concerning for hematoma vs. retained POC  - b-hCG 2767 (11/9)->2153 (11/10)->2667 (11/11)-->2360 on 11/12 - no need to further trend       * concerns for heterotropic pregnancy vs molar pregnancy  - CT chest done: no metastatic disease  - MRI Abd/pelvis performed to r/o heterotropic pregnancy - read pending, will f/u results  - keep active T&S  - f/u UA/Urine cx - pending collection  - Ob/gyn following, recs appreciated. Plan is pending MRI results

## 2022-11-13 NOTE — DISCHARGE NOTE PROVIDER - HOSPITAL COURSE
36 yo , LMP end of Aug with no PMH present for heavy vaginal bleed in the setting recent D&C few weeks ago admitted for further gynecologic work-up with concern for retained POC      Problem/Plan - 1:  ·  Problem: Vaginal bleeding.   ·  Plan: acute heavy vag bleed i/s/o recent D&C apx 2wk ago   - In ED experienced multiple brief episode of unresponsiveness    - Was also noted to have acute drop in hg 9.8>> 8.3>>8.0  - Given 1u PRBC in ED, hemoglobin now stable in 7-8 range, remained stable in low 7s for at least 2 days with on sypmtoms  - Received Rhogam  - s/p Cytotec 600mcg vaginal performed by OB-Gyn   - TVUS obtained and neg for retained products of conception   - repeat TVUS on 22 changed with new heterogenous mass concerning for hematoma vs. retained POC  - b-hCG 2767 ()->2153 (11/10)->2667 ()-->2360 on  - no need to further trend  - CT chest done: no metastatic disease  - MRI Abd/pelvis: Mass in the anterior lower uterine segment appears centered in the myometrium, possibly degenerating leiomyoma.  -per GYN, given abnormal HCG Levels without any signs of retained POC on imaging, recommend mngt with MTX.  No clinical indication for surgical intervention - bleeding is improved and no evidence of retained POC on mulitple imaging modalities.  -pt was given methotrexate by gyn and will f/u on Day4 and Day 7 - [facility] EDI Jay - task sent for f/u apt on Tuesday 11/15.     Problem/Plan - 2:  ·  Problem: AMS (altered mental status).   ·  Plan: P/w acute heavy vag bleed i/s/o recent D&C apx 2wk ago   - In ED, experienced multiple brief episode of unresponsiveness   - Endorsed poor PO intake  - AMS possibly 2/2 hypovolemia from poor intake c/b vag bleed   - AMS now resolved. mentating well with no issues  - CT head: neg for intracranial pathology   - Apprec Neuro recs, no additional workup necessary but if recurs then consider MRI and/or EEG.  -pt at baseline mental status oriented x 4.     Problem/Plan - 3:  ·  Problem: Prophylactic measure.   ·  Plan: DVT ppx: ambulate    dispo: d/c home today with close GYN f/u this tuesday as above and PCP, needs to get cbc check this week with PPC or GYN    spent 45 min on d/c time       36 yo , LMP end of Aug with no PMH present for heavy vaginal bleed in the setting recent D&C few weeks ago admitted for further gynecologic work-up with concern for retained POC.  When patient presented to the ED, she had periods of AMS and did experience some brief episodes of unresponsiveness, which has since resolved  and now pt at baseline mental status oriented x 4.  It was noted on lab results an acute drop in her Hb 9.8->8.3->8.0. Pt was given 1uPRBC and Hb stabilized. Pt remained asymptomatic after transfusion. Gyn was consulted, a TVUS performed:(22 @ 20:10) -- Normal endometrial thickness measuring 4 mm without hypervascularity. No findings to suggest retained products of conception. Uterine fibroids, the largest measuring up to 4 cm in the lower uterine segment. Unremarkable ovaries. and pt received Rhogam (pt is Rh Neg) and Cytotec 600mcg per vagina. US Transvaginal (22 @ 12:15) ---  Heterogeneous mass is seen at the level of the cervix and lower uterine segment measuring 3.8 cm with scant vascularity. The findings suggest hematoma. Small retained products cannot be excluded. No adnexal masses seen. Trace free fluid.  . Serum Beta HCG trends as follows: 2767 ()->2153 (11/10)->2667 ()-->2360 on  - no need to further trend in patient. Pt will follow beta levels out patient. CT Chest did not reveal any metastatic disease and MRI AP revealed Mass in the anterior lower uterine segment appears centered in the myometrium, possibly degenerating leiomyoma. Per GYN, given abnormal HCG Levels without any signs of retained POC on imaging, recommend management with Methotrexate.  No clinical indication for surgical intervention - bleeding is improved and no evidence of retained POC on multiple imaging modalities. Patient was ordered for methotrexate by gyn and will follow up on Day4 and Day 7 with Gyn team - [facility] EDI Jay - task sent for f/u apt on Tuesday 11/15.  Patient given strict precautions to call her physician or return to ED if she experiences any severe abdominal pain, dizziness, lightheadedness, severe n/v, or any heavy vaginal bleeding >2 pads/hour for >2 hours.    -Patient instructed on need for follow up of b-hcg on day 4 and day 7 of methotrexate therapy.  Patient intends to follow up in the ED or GYN office on TUESDAY 11/15 and     H/H Trend  22 @ 07:11   -  7.2<L> / 22.6<L>  22 @ 05:37   -  7.1<L> / 22.3<L>  22 @ 06:11   -  8.0<L> / 23.1<L>    Vital Signs Last 24 Hrs  T(C): 36.9 (2022 12:18), Max: 37.1 (2022 21:12)  T(F): 98.5 (2022 12:18), Max: 98.8 (2022 21:12)  HR: 71 (2022 12:18) (63 - 85)  BP: 117/65 (2022 12:18) (117/65 - 125/66)  RR: 18 (2022 12:18) (17 - 18)  SpO2: 96% (2022 12:18) (96% - 98%)    Parameters below as of 2022 12:18  Patient On (Oxygen Delivery Method): room air

## 2022-11-13 NOTE — DISCHARGE NOTE PROVIDER - NSDCMRMEDTOKEN_GEN_ALL_CORE_FT
mg oral tablet: 1 tab(s) orally every 8 hours, As Needed -for severe pain   acetaminophen 325 mg oral tablet: 3 tab(s) orally every 6 hours, As Needed - 3)   mg oral tablet: 1 tab(s) orally every 8 hours, As Needed -for severe pain  melatonin 3 mg oral tablet: 1 tab(s) orally once a day (at bedtime), As needed, Insomnia

## 2022-11-13 NOTE — PROGRESS NOTE ADULT - ASSESSMENT
38 yo , LMP end of Aug with no PMH present for heavy vaginal bleed in the setting recent D&C few weeks ago admitted for further gynecologic work-up with concern for retained POC

## 2022-11-13 NOTE — PROGRESS NOTE ADULT - PROBLEM SELECTOR PLAN 4
DVT ppx: ambulate    dispo: d/c home today with close GYN f/u this tuesday as above and PCP, needs to get cbc check this week with PPC or GYN    spent 45 min on d/c time    discussed with JOSE Ribeiro

## 2022-11-13 NOTE — PROGRESS NOTE ADULT - PROBLEM SELECTOR PLAN 3
DVT ppx: ambulate    dispo: d/c home today with close GYN f/u this tuesday as above and PCP, needs to get cbc check this week with PPC or GYN    spent 45 min on d/c time    discussed with JOSE Ribeiro headache resolved  ct head negative

## 2022-11-13 NOTE — DISCHARGE NOTE NURSING/CASE MANAGEMENT/SOCIAL WORK - PATIENT PORTAL LINK FT
You can access the FollowMyHealth Patient Portal offered by [facility] by registering at the following website: [email or web address]. By joining [employer]’s FollowMyHealth portal, you will also be able to view your health information using other applications (apps) compatible with our system.

## 2022-11-13 NOTE — PROGRESS NOTE ADULT - SUBJECTIVE AND OBJECTIVE BOX
R2 GYN Progress Note  HD#5     Patient seen and examined at bedside this AM.    Patient states that she had mild nausea this AM though no vomiting s/p methotrexate.   Patient denies pain.  Patient is ambulating and tolerating a regular diet.  Patient is passing flatus.    Patient is voiding spontaneously.  Denies CP, SOB, fevers, and chills.      Vital Signs Last 24 Hours  T(C): 36.9 (11-13-22 @ 12:18), Max: 37.1 (11-12-22 @ 21:12)  HR: 71 (11-13-22 @ 12:18) (63 - 85)  BP: 117/65 (11-13-22 @ 12:18) (117/65 - 125/66)  RR: 18 (11-13-22 @ 12:18) (17 - 18)  SpO2: 96% (11-13-22 @ 12:18) (96% - 98%)    I&O's Summary    12 Nov 2022 07:01  -  13 Nov 2022 07:00  --------------------------------------------------------  IN: 1040 mL / OUT: 0 mL / NET: 1040 mL      Physical Exam:  General: NAD  CV: RR, S1, S2, no M/R/G  Lungs: CTA b/l, good air flow b/l   Abdomen: Soft, appropriately-tender, softly distended, tympanic, normoactive bowel sounds  : miniml bleeding on pad  Ext: No pain or swelling     Labs:                        7.2    4.18  )-----------( 155      ( 13 Nov 2022 07:11 )             22.6   baso x      eos x      imm gran x      lymph x      mono x      poly x                            7.1    4.15  )-----------( 141      ( 12 Nov 2022 05:37 )             22.3   baso x      eos x      imm gran x      lymph x      mono x      poly x                            8.0    4.00  )-----------( 163      ( 11 Nov 2022 06:11 )             23.1   baso x      eos x      imm gran x      lymph x      mono x      poly x          MEDICATIONS  (STANDING):  chlorhexidine 2% Cloths 1 Application(s) Topical <User Schedule>    MEDICATIONS  (PRN):  acetaminophen     Tablet .. 650 milliGRAM(s) Oral every 6 hours PRN Temp greater or equal to 38C (100.4F), Mild Pain (1 - 3)  acetaminophen   IVPB .. 1000 milliGRAM(s) IV Intermittent once PRN Mild Pain (1 - 3)  aluminum hydroxide/magnesium hydroxide/simethicone Suspension 30 milliLiter(s) Oral every 4 hours PRN Dyspepsia  melatonin 3 milliGRAM(s) Oral at bedtime PRN Insomnia  ondansetron Injectable 4 milliGRAM(s) IV Push every 8 hours PRN Nausea and/or Vomiting

## 2022-11-13 NOTE — DISCHARGE NOTE PROVIDER - NSDCCPCAREPLAN_GEN_ALL_CORE_FT
PRINCIPAL DISCHARGE DIAGNOSIS  Diagnosis: Vaginal bleeding  Assessment and Plan of Treatment: resolved

## 2022-11-13 NOTE — DISCHARGE NOTE PROVIDER - NSDCFUADDAPPT_GEN_ALL_CORE_FT
YOU NEED TO FOLLOW UP ON TUESDAY 11/15 and FRIDAY 11/18 FOR BLOOD TEST TO CHECK THE BETA LEVELS AFTER ADMINISTRATION OF METHOTREXATE

## 2022-11-13 NOTE — PROVIDER CONTACT NOTE (OTHER) - BACKGROUND
Pt had incomplete  & went to [facility] for D&C apx 2wk ago.  Since then she had  period like bleeding.

## 2022-11-13 NOTE — PROGRESS NOTE ADULT - ASSESSMENT
A/P: 38y/o POD#22 HD#5 p/w VB and AMS s/p D&C for TOP on 10/22/22.  Patient now Day 2 s/p Methotrexate for PUL given plateauing beta HCG s/p D+C. Patient is stable and doing well this AM with minimal bleeding.     Neuro: Patient A&O x4 this AM. PO pain meds.   -CT Head (11/9): No acute intracranial pathology.  CV: Hemodynamically stable  Pulm: Saturating well on room air, encourage oob/amb  -CT Chest (11/11): Clear lungs.  GI: Continue regular diet  : UOP adequate, Voiding spontaneously  -TVUS(11/9): Normal endometrial thickness measuring 4 mm without hypervascularity. No findings to suggest retained products of conception. Uterine fibroids, the largest measuring up to 4 cm in the lower uterine segment. Unremarkable ovaries.  -TVUS(11/11): Heterogeneous mass is seen at the level of the cervix and lower uterine segment measuring 3.8 cm with scant vascularity. The findings suggest hematoma. Small retained products cannot be excluded. No adnexal masses seen. Trace free fluid.  -MRI A/P(11/11): Mass in the anterior lower uterine segment appears centered in the myometrium, possibly degenerating leiomyoma.  Heme: Hemodynamically stable this AM. Hgb 7.2, though patient denies symptoms related to anemia c/w ambulation for DVT ppx  FEN: LR@100. replete electrolytes prn   ID: Afebrile, WBC wnl this AM  Endo: No active issues   Dispo: Continue routine inpatient care per primary team, patient cleared for d/c from gyn perspective    Patient status d/w Safety officers Dr. Lew and Dr. Fisher,  Joanne Mario, PGY2

## 2022-11-15 ENCOUNTER — APPOINTMENT (OUTPATIENT)
Dept: OBGYN | Facility: CLINIC | Age: 37
End: 2022-11-15

## 2022-11-15 LAB
PYRIDOXAL PHOS SERPL-MCNC: 6.1 UG/L — SIGNIFICANT CHANGE UP (ref 3.4–65.2)
VIT B1 SERPL-MCNC: 59.9 NMOL/L — LOW (ref 66.5–200)

## 2022-11-16 ENCOUNTER — APPOINTMENT (OUTPATIENT)
Dept: OBGYN | Facility: CLINIC | Age: 37
End: 2022-11-16

## 2022-11-16 VITALS
BODY MASS INDEX: 28.14 KG/M2 | WEIGHT: 190 LBS | DIASTOLIC BLOOD PRESSURE: 75 MMHG | HEIGHT: 69 IN | SYSTOLIC BLOOD PRESSURE: 121 MMHG

## 2022-11-16 DIAGNOSIS — N93.9 ABNORMAL UTERINE AND VAGINAL BLEEDING, UNSPECIFIED: ICD-10-CM

## 2022-11-16 PROCEDURE — 99203 OFFICE O/P NEW LOW 30 MIN: CPT

## 2022-11-16 RX ORDER — ERGOCALCIFEROL 1.25 MG/1
1.25 MG CAPSULE, LIQUID FILLED ORAL
Qty: 12 | Refills: 0 | Status: DISCONTINUED | COMMUNITY
Start: 2021-07-28 | End: 2022-11-16

## 2022-11-16 RX ORDER — AZITHROMYCIN 500 MG/1
500 TABLET, FILM COATED ORAL
Qty: 1 | Refills: 0 | Status: DISCONTINUED | COMMUNITY
Start: 2022-10-12

## 2022-11-16 NOTE — HISTORY OF PRESENT ILLNESS
[Regular Cycle Intervals] : periods have been regular [Currently Active] : currently active [Humeradate] : 1YR AGO [PGHxTotal] : 2 [PGHxABSpont] : 1

## 2022-11-16 NOTE — PHYSICAL EXAM
[Appropriately responsive] : appropriately responsive [Alert] : alert [No Acute Distress] : no acute distress [No Lymphadenopathy] : no lymphadenopathy [Soft] : soft [Non-tender] : non-tender [Non-distended] : non-distended [No HSM] : No HSM [No Lesions] : no lesions [No Mass] : no mass [Oriented x3] : oriented x3 [Examination Of The Breasts] : a normal appearance [No Masses] : no breast masses were palpable [Labia Majora] : normal [Labia Minora] : normal [Normal] : normal [Uterine Adnexae] : normal [FreeTextEntry5] : OS APPROX 0.5CM DILATED W VISIBLE CLOT IN CANAL-  ATTEMPTED TO REMOVE CLOT W RINGED FORCEP BUT UNSUCCESSFUL [FreeTextEntry6] : MILD LEFT ADNEXAL TENDERNESS (CHRONIC AS PER PT)

## 2022-11-18 LAB
BASOPHILS # BLD AUTO: 0.03 K/UL
BASOPHILS NFR BLD AUTO: 0.6 %
EOSINOPHIL # BLD AUTO: 0.1 K/UL
EOSINOPHIL NFR BLD AUTO: 2 %
HCG SERPL-MCNC: 1273 MIU/ML
HCG SERPL-MCNC: 679 MIU/ML
HCT VFR BLD CALC: 25.2 %
HGB BLD-MCNC: 8.1 G/DL
IMM GRANULOCYTES NFR BLD AUTO: 0.2 %
LYMPHOCYTES # BLD AUTO: 2.56 K/UL
LYMPHOCYTES NFR BLD AUTO: 52.2 %
MAN DIFF?: NORMAL
MCHC RBC-ENTMCNC: 29.9 PG
MCHC RBC-ENTMCNC: 32.1 GM/DL
MCV RBC AUTO: 93 FL
MONOCYTES # BLD AUTO: 0.36 K/UL
MONOCYTES NFR BLD AUTO: 7.3 %
NEUTROPHILS # BLD AUTO: 1.84 K/UL
NEUTROPHILS NFR BLD AUTO: 37.7 %
PLATELET # BLD AUTO: 237 K/UL
RBC # BLD: 2.71 M/UL
RBC # FLD: 13.6 %
WBC # FLD AUTO: 4.9 K/UL

## 2022-11-29 LAB — HCG SERPL-MCNC: 47 MIU/ML

## 2022-12-06 ENCOUNTER — APPOINTMENT (OUTPATIENT)
Dept: OBGYN | Facility: CLINIC | Age: 37
End: 2022-12-06

## 2022-12-06 VITALS
DIASTOLIC BLOOD PRESSURE: 81 MMHG | SYSTOLIC BLOOD PRESSURE: 149 MMHG | WEIGHT: 193 LBS | HEIGHT: 69 IN | BODY MASS INDEX: 28.58 KG/M2

## 2022-12-06 LAB — HCG SERPL-MCNC: 12 MIU/ML

## 2022-12-06 PROCEDURE — 76857 US EXAM PELVIC LIMITED: CPT

## 2022-12-06 PROCEDURE — 99213 OFFICE O/P EST LOW 20 MIN: CPT | Mod: 25

## 2023-08-14 ENCOUNTER — NON-APPOINTMENT (OUTPATIENT)
Age: 38
End: 2023-08-14

## 2023-12-06 ENCOUNTER — APPOINTMENT (OUTPATIENT)
Dept: OBGYN | Facility: CLINIC | Age: 38
End: 2023-12-06
Payer: COMMERCIAL

## 2023-12-06 VITALS
BODY MASS INDEX: 29.47 KG/M2 | HEIGHT: 69 IN | WEIGHT: 199 LBS | DIASTOLIC BLOOD PRESSURE: 70 MMHG | SYSTOLIC BLOOD PRESSURE: 116 MMHG

## 2023-12-06 DIAGNOSIS — O36.80X0 PREGNANCY WITH INCONCLUSIVE FETAL VIABILITY, NOT APPLICABLE OR UNSPECIFIED: ICD-10-CM

## 2023-12-06 DIAGNOSIS — O20.0 THREATENED ABORTION: ICD-10-CM

## 2023-12-06 PROCEDURE — 76830 TRANSVAGINAL US NON-OB: CPT

## 2023-12-06 PROCEDURE — 99214 OFFICE O/P EST MOD 30 MIN: CPT

## 2023-12-07 LAB
ABO + RH PNL BLD: NORMAL
ALBUMIN SERPL ELPH-MCNC: 4.7 G/DL
ALP BLD-CCNC: 55 U/L
ALT SERPL-CCNC: 13 U/L
ANION GAP SERPL CALC-SCNC: 11 MMOL/L
AST SERPL-CCNC: 12 U/L
BILIRUB SERPL-MCNC: 0.5 MG/DL
BLD GP AB SCN SERPL QL: NORMAL
BUN SERPL-MCNC: 11 MG/DL
C TRACH RRNA SPEC QL NAA+PROBE: NOT DETECTED
CALCIUM SERPL-MCNC: 9.9 MG/DL
CHLORIDE SERPL-SCNC: 102 MMOL/L
CO2 SERPL-SCNC: 23 MMOL/L
CREAT SERPL-MCNC: 0.74 MG/DL
EGFR: 107 ML/MIN/1.73M2
GLUCOSE SERPL-MCNC: 76 MG/DL
HBV SURFACE AG SER QL: NONREACTIVE
HCT VFR BLD CALC: 39.6 %
HCV AB SER QL: NONREACTIVE
HCV S/CO RATIO: 0.1 S/CO
HGB BLD-MCNC: 12.6 G/DL
HIV1+2 AB SPEC QL IA.RAPID: NONREACTIVE
LEAD BLD-MCNC: <1 UG/DL
MCHC RBC-ENTMCNC: 28.4 PG
MCHC RBC-ENTMCNC: 31.8 GM/DL
MCV RBC AUTO: 89.2 FL
MEV IGG FLD QL IA: 43.3 AU/ML
MEV IGG+IGM SER-IMP: POSITIVE
N GONORRHOEA RRNA SPEC QL NAA+PROBE: NOT DETECTED
PLATELET # BLD AUTO: 205 K/UL
POTASSIUM SERPL-SCNC: 4.1 MMOL/L
PROT SERPL-MCNC: 7.4 G/DL
RBC # BLD: 4.44 M/UL
RBC # FLD: 15.2 %
RUBV IGG FLD-ACNC: 4.7 INDEX
RUBV IGG SER-IMP: POSITIVE
SODIUM SERPL-SCNC: 136 MMOL/L
SOURCE AMPLIFICATION: NORMAL
T PALLIDUM AB SER QL IA: NEGATIVE
TSH SERPL-ACNC: 0.2 UIU/ML
WBC # FLD AUTO: 4.43 K/UL

## 2023-12-08 LAB — BACTERIA UR CULT: NORMAL

## 2023-12-10 ENCOUNTER — NON-APPOINTMENT (OUTPATIENT)
Age: 38
End: 2023-12-10

## 2023-12-13 LAB
B19V IGG SER QL IA: POSITIVE
B19V IGG+IGM SER-IMP: NORMAL
B19V IGM FLD-ACNC: NEGATIVE

## 2023-12-29 ENCOUNTER — APPOINTMENT (OUTPATIENT)
Dept: OBGYN | Facility: CLINIC | Age: 38
End: 2023-12-29
Payer: COMMERCIAL

## 2023-12-29 VITALS — BODY MASS INDEX: 30.42 KG/M2 | SYSTOLIC BLOOD PRESSURE: 135 MMHG | WEIGHT: 206 LBS | DIASTOLIC BLOOD PRESSURE: 83 MMHG

## 2023-12-29 PROCEDURE — 0501F PRENATAL FLOW SHEET: CPT

## 2024-01-02 ENCOUNTER — LABORATORY RESULT (OUTPATIENT)
Age: 39
End: 2024-01-02

## 2024-01-26 ENCOUNTER — NON-APPOINTMENT (OUTPATIENT)
Age: 39
End: 2024-01-26

## 2024-01-29 ENCOUNTER — APPOINTMENT (OUTPATIENT)
Dept: OBGYN | Facility: CLINIC | Age: 39
End: 2024-01-29
Payer: COMMERCIAL

## 2024-01-29 VITALS — DIASTOLIC BLOOD PRESSURE: 62 MMHG | SYSTOLIC BLOOD PRESSURE: 112 MMHG

## 2024-01-29 DIAGNOSIS — O20.9 HEMORRHAGE IN EARLY PREGNANCY, UNSPECIFIED: ICD-10-CM

## 2024-01-29 PROCEDURE — 0502F SUBSEQUENT PRENATAL CARE: CPT

## 2024-01-29 PROCEDURE — 99213 OFFICE O/P EST LOW 20 MIN: CPT

## 2024-01-30 LAB
CMV IGG SERPL QL: 5.6 U/ML
CMV IGG SERPL-IMP: POSITIVE
ESTIMATED AVERAGE GLUCOSE: 111 MG/DL
FOLATE SERPL-MCNC: >20 NG/ML
HBA1C MFR BLD HPLC: 5.5 %
HCG SERPL-MCNC: ABNORMAL MIU/ML
HGB A MFR BLD: 97.3 %
HGB A2 MFR BLD: 2.7 %
HGB FRACT BLD-IMP: NORMAL
IRON SATN MFR SERPL: 32 %
IRON SERPL-MCNC: 104 UG/DL
MUV AB SER-ACNC: NEGATIVE
MUV IGG SER QL IA: 6.5 AU/ML
TIBC SERPL-MCNC: 330 UG/DL
TSH SERPL-ACNC: 0.25 UIU/ML
UIBC SERPL-MCNC: 226 UG/DL
VIT B12 SERPL-MCNC: 325 PG/ML
VZV AB TITR SER: POSITIVE
VZV IGG SER IF-ACNC: 1061 INDEX

## 2024-01-31 ENCOUNTER — ASOB RESULT (OUTPATIENT)
Age: 39
End: 2024-01-31

## 2024-01-31 ENCOUNTER — NON-APPOINTMENT (OUTPATIENT)
Age: 39
End: 2024-01-31

## 2024-01-31 ENCOUNTER — APPOINTMENT (OUTPATIENT)
Dept: OBGYN | Facility: CLINIC | Age: 39
End: 2024-01-31
Payer: MEDICARE

## 2024-01-31 ENCOUNTER — APPOINTMENT (OUTPATIENT)
Dept: ANTEPARTUM | Facility: CLINIC | Age: 39
End: 2024-01-31
Payer: COMMERCIAL

## 2024-01-31 PROCEDURE — 76817 TRANSVAGINAL US OBSTETRIC: CPT

## 2024-01-31 PROCEDURE — 76805 OB US >/= 14 WKS SNGL FETUS: CPT | Mod: 59

## 2024-01-31 PROCEDURE — 0502F SUBSEQUENT PRENATAL CARE: CPT

## 2024-01-31 RX ORDER — PROGESTERONE 200 MG/1
200 CAPSULE ORAL
Qty: 30 | Refills: 5 | Status: ACTIVE | COMMUNITY
Start: 2024-01-31 | End: 1900-01-01

## 2024-02-02 ENCOUNTER — NON-APPOINTMENT (OUTPATIENT)
Age: 39
End: 2024-02-02

## 2024-02-02 ENCOUNTER — APPOINTMENT (OUTPATIENT)
Dept: OBGYN | Facility: CLINIC | Age: 39
End: 2024-02-02

## 2024-02-08 ENCOUNTER — APPOINTMENT (OUTPATIENT)
Dept: OBGYN | Facility: CLINIC | Age: 39
End: 2024-02-08
Payer: MEDICARE

## 2024-02-08 ENCOUNTER — ASOB RESULT (OUTPATIENT)
Age: 39
End: 2024-02-08

## 2024-02-08 ENCOUNTER — APPOINTMENT (OUTPATIENT)
Dept: ANTEPARTUM | Facility: CLINIC | Age: 39
End: 2024-02-08
Payer: MEDICARE

## 2024-02-08 VITALS — WEIGHT: 215 LBS | DIASTOLIC BLOOD PRESSURE: 80 MMHG | SYSTOLIC BLOOD PRESSURE: 124 MMHG | BODY MASS INDEX: 31.75 KG/M2

## 2024-02-08 PROCEDURE — 0502F SUBSEQUENT PRENATAL CARE: CPT

## 2024-02-08 PROCEDURE — 76817 TRANSVAGINAL US OBSTETRIC: CPT

## 2024-02-16 ENCOUNTER — APPOINTMENT (OUTPATIENT)
Dept: ANTEPARTUM | Facility: CLINIC | Age: 39
End: 2024-02-16
Payer: COMMERCIAL

## 2024-02-16 ENCOUNTER — ASOB RESULT (OUTPATIENT)
Age: 39
End: 2024-02-16

## 2024-02-16 PROCEDURE — 76815 OB US LIMITED FETUS(S): CPT

## 2024-02-16 PROCEDURE — 76817 TRANSVAGINAL US OBSTETRIC: CPT

## 2024-02-20 ENCOUNTER — NON-APPOINTMENT (OUTPATIENT)
Age: 39
End: 2024-02-20

## 2024-02-21 ENCOUNTER — APPOINTMENT (OUTPATIENT)
Dept: OBGYN | Facility: CLINIC | Age: 39
End: 2024-02-21
Payer: MEDICARE

## 2024-02-21 VITALS
HEIGHT: 69 IN | SYSTOLIC BLOOD PRESSURE: 131 MMHG | BODY MASS INDEX: 31.7 KG/M2 | DIASTOLIC BLOOD PRESSURE: 73 MMHG | WEIGHT: 214 LBS

## 2024-02-21 PROCEDURE — 0502F SUBSEQUENT PRENATAL CARE: CPT

## 2024-02-26 LAB
ALBUMIN SERPL ELPH-MCNC: 4.1 G/DL
ALP BLD-CCNC: 50 U/L
ALT SERPL-CCNC: 19 U/L
ANION GAP SERPL CALC-SCNC: 12 MMOL/L
AST SERPL-CCNC: 16 U/L
BASOPHILS # BLD AUTO: 0.03 K/UL
BASOPHILS NFR BLD AUTO: 0.5 %
BILIRUB SERPL-MCNC: <0.2 MG/DL
BUN SERPL-MCNC: 10 MG/DL
CALCIUM SERPL-MCNC: 10.1 MG/DL
CHLORIDE SERPL-SCNC: 100 MMOL/L
CO2 SERPL-SCNC: 22 MMOL/L
CREAT SERPL-MCNC: 0.62 MG/DL
EGFR: 118 ML/MIN/1.73M2
EOSINOPHIL # BLD AUTO: 0.1 K/UL
EOSINOPHIL NFR BLD AUTO: 1.7 %
GLUCOSE SERPL-MCNC: 116 MG/DL
HCG SERPL-MCNC: ABNORMAL MIU/ML
HCT VFR BLD CALC: 35.9 %
HGB BLD-MCNC: 11.6 G/DL
IMM GRANULOCYTES NFR BLD AUTO: 0.2 %
LYMPHOCYTES # BLD AUTO: 1.78 K/UL
LYMPHOCYTES NFR BLD AUTO: 29.4 %
MAN DIFF?: NORMAL
MCHC RBC-ENTMCNC: 29.4 PG
MCHC RBC-ENTMCNC: 32.3 GM/DL
MCV RBC AUTO: 90.9 FL
MONOCYTES # BLD AUTO: 0.58 K/UL
MONOCYTES NFR BLD AUTO: 9.6 %
NEUTROPHILS # BLD AUTO: 3.55 K/UL
NEUTROPHILS NFR BLD AUTO: 58.6 %
PLATELET # BLD AUTO: 168 K/UL
POTASSIUM SERPL-SCNC: 3.9 MMOL/L
PROT SERPL-MCNC: 6.9 G/DL
RBC # BLD: 3.95 M/UL
RBC # FLD: 14.6 %
SODIUM SERPL-SCNC: 134 MMOL/L
TSH SERPL-ACNC: 0.38 UIU/ML
TSH SERPL-ACNC: 0.39 UIU/ML
WBC # FLD AUTO: 6.05 K/UL

## 2024-02-27 LAB
AFP MOM: 1.24
AFP VALUE: 45.5 NG/ML
ALPHA FETOPROTEIN SERUM COMMENT: NORMAL
ALPHA FETOPROTEIN SERUM INTERPRETATION: NORMAL
ALPHA FETOPROTEIN SERUM RESULTS: NORMAL
ALPHA FETOPROTEIN SERUM TEST RESULTS: NORMAL
GESTATIONAL AGE BASED ON: NORMAL
GESTATIONAL AGE ON COLLECTION DATE: 17.6 WEEKS
INSULIN DEP DIABETES: NO
MATERNAL AGE AT EDD AFP: 37.5 YR
MULTIPLE GESTATION: NO
OSBR RISK 1 IN: NORMAL
RACE: NORMAL
WEIGHT AFP: 215 LBS

## 2024-03-13 ENCOUNTER — NON-APPOINTMENT (OUTPATIENT)
Age: 39
End: 2024-03-13

## 2024-03-13 NOTE — ED PROVIDER NOTE - NS ED MD DISPO ADMITTING SERVICE
MED

## 2024-03-20 ENCOUNTER — ASOB RESULT (OUTPATIENT)
Age: 39
End: 2024-03-20

## 2024-03-20 ENCOUNTER — APPOINTMENT (OUTPATIENT)
Dept: OBGYN | Facility: CLINIC | Age: 39
End: 2024-03-20

## 2024-03-20 ENCOUNTER — APPOINTMENT (OUTPATIENT)
Dept: ANTEPARTUM | Facility: CLINIC | Age: 39
End: 2024-03-20
Payer: COMMERCIAL

## 2024-03-20 ENCOUNTER — NON-APPOINTMENT (OUTPATIENT)
Age: 39
End: 2024-03-20

## 2024-03-20 ENCOUNTER — TRANSCRIPTION ENCOUNTER (OUTPATIENT)
Age: 39
End: 2024-03-20

## 2024-03-20 ENCOUNTER — INPATIENT (INPATIENT)
Facility: HOSPITAL | Age: 39
LOS: 0 days | Discharge: HOME | DRG: 819 | End: 2024-03-21
Attending: OBSTETRICS & GYNECOLOGY | Admitting: OBSTETRICS & GYNECOLOGY
Payer: COMMERCIAL

## 2024-03-20 VITALS
HEART RATE: 88 BPM | RESPIRATION RATE: 16 BRPM | SYSTOLIC BLOOD PRESSURE: 129 MMHG | OXYGEN SATURATION: 100 % | TEMPERATURE: 98 F | DIASTOLIC BLOOD PRESSURE: 64 MMHG

## 2024-03-20 DIAGNOSIS — O26.899 OTHER SPECIFIED PREGNANCY RELATED CONDITIONS, UNSPECIFIED TRIMESTER: ICD-10-CM

## 2024-03-20 DIAGNOSIS — Z34.80 ENCOUNTER FOR SUPERVISION OF OTHER NORMAL PREGNANCY, UNSPECIFIED TRIMESTER: ICD-10-CM

## 2024-03-20 LAB
APPEARANCE UR: CLEAR — SIGNIFICANT CHANGE UP
BASOPHILS # BLD AUTO: 0.03 K/UL — SIGNIFICANT CHANGE UP (ref 0–0.2)
BASOPHILS NFR BLD AUTO: 0.6 % — SIGNIFICANT CHANGE UP (ref 0–2)
BILIRUB UR-MCNC: NEGATIVE — SIGNIFICANT CHANGE UP
BLD GP AB SCN SERPL QL: POSITIVE — SIGNIFICANT CHANGE UP
COLOR SPEC: YELLOW — SIGNIFICANT CHANGE UP
DIFF PNL FLD: NEGATIVE — SIGNIFICANT CHANGE UP
EOSINOPHIL # BLD AUTO: 0.09 K/UL — SIGNIFICANT CHANGE UP (ref 0–0.5)
EOSINOPHIL NFR BLD AUTO: 1.8 % — SIGNIFICANT CHANGE UP (ref 0–6)
GLUCOSE UR QL: NEGATIVE MG/DL — SIGNIFICANT CHANGE UP
HCT VFR BLD CALC: 35.2 % — SIGNIFICANT CHANGE UP (ref 34.5–45)
HGB BLD-MCNC: 11.6 G/DL — SIGNIFICANT CHANGE UP (ref 11.5–15.5)
IMM GRANULOCYTES NFR BLD AUTO: 0.4 % — SIGNIFICANT CHANGE UP (ref 0–0.9)
KETONES UR-MCNC: 15 MG/DL
LEUKOCYTE ESTERASE UR-ACNC: NEGATIVE — SIGNIFICANT CHANGE UP
LYMPHOCYTES # BLD AUTO: 1.53 K/UL — SIGNIFICANT CHANGE UP (ref 1–3.3)
LYMPHOCYTES # BLD AUTO: 31 % — SIGNIFICANT CHANGE UP (ref 13–44)
MCHC RBC-ENTMCNC: 29.8 PG — SIGNIFICANT CHANGE UP (ref 27–34)
MCHC RBC-ENTMCNC: 33 GM/DL — SIGNIFICANT CHANGE UP (ref 32–36)
MCV RBC AUTO: 90.5 FL — SIGNIFICANT CHANGE UP (ref 80–100)
MONOCYTES # BLD AUTO: 0.46 K/UL — SIGNIFICANT CHANGE UP (ref 0–0.9)
MONOCYTES NFR BLD AUTO: 9.3 % — SIGNIFICANT CHANGE UP (ref 2–14)
NEUTROPHILS # BLD AUTO: 2.81 K/UL — SIGNIFICANT CHANGE UP (ref 1.8–7.4)
NEUTROPHILS NFR BLD AUTO: 56.9 % — SIGNIFICANT CHANGE UP (ref 43–77)
NITRITE UR-MCNC: NEGATIVE — SIGNIFICANT CHANGE UP
NRBC # BLD: 0 /100 WBCS — SIGNIFICANT CHANGE UP (ref 0–0)
PH UR: 6.5 — SIGNIFICANT CHANGE UP (ref 5–8)
PLATELET # BLD AUTO: 161 K/UL — SIGNIFICANT CHANGE UP (ref 150–400)
PROT UR-MCNC: NEGATIVE MG/DL — SIGNIFICANT CHANGE UP
RBC # BLD: 3.89 M/UL — SIGNIFICANT CHANGE UP (ref 3.8–5.2)
RBC # FLD: 13.8 % — SIGNIFICANT CHANGE UP (ref 10.3–14.5)
RH IG SCN BLD-IMP: NEGATIVE — SIGNIFICANT CHANGE UP
SP GR SPEC: 1.01 — SIGNIFICANT CHANGE UP (ref 1–1.03)
UROBILINOGEN FLD QL: 0.2 MG/DL — SIGNIFICANT CHANGE UP (ref 0.2–1)
WBC # BLD: 4.94 K/UL — SIGNIFICANT CHANGE UP (ref 3.8–10.5)
WBC # FLD AUTO: 4.94 K/UL — SIGNIFICANT CHANGE UP (ref 3.8–10.5)

## 2024-03-20 PROCEDURE — 76817 TRANSVAGINAL US OBSTETRIC: CPT | Mod: 59

## 2024-03-20 PROCEDURE — 76811 OB US DETAILED SNGL FETUS: CPT

## 2024-03-20 PROCEDURE — 99232 SBSQ HOSP IP/OBS MODERATE 35: CPT | Mod: GC

## 2024-03-20 RX ORDER — INFLUENZA VIRUS VACCINE 15; 15; 15; 15 UG/.5ML; UG/.5ML; UG/.5ML; UG/.5ML
0.5 SUSPENSION INTRAMUSCULAR ONCE
Refills: 0 | Status: DISCONTINUED | OUTPATIENT
Start: 2024-03-20 | End: 2024-03-21

## 2024-03-20 RX ORDER — SODIUM CHLORIDE 9 MG/ML
1000 INJECTION, SOLUTION INTRAVENOUS
Refills: 0 | Status: COMPLETED | OUTPATIENT
Start: 2024-03-20 | End: 2024-03-20

## 2024-03-20 RX ORDER — SODIUM CHLORIDE 9 MG/ML
1000 INJECTION, SOLUTION INTRAVENOUS
Refills: 0 | Status: DISCONTINUED | OUTPATIENT
Start: 2024-03-20 | End: 2024-03-20

## 2024-03-20 RX ADMIN — SODIUM CHLORIDE 125 MILLILITER(S): 9 INJECTION, SOLUTION INTRAVENOUS at 23:40

## 2024-03-20 NOTE — OB PROVIDER H&P - NSLOWPPHRISK_OBGYN_A_OB
No previous uterine incision/[place] Pregnancy/Less than or equal to 4 previous vaginal births/No known bleeding disorder/No history of postpartum hemorrhage/No other PPH risks indicated

## 2024-03-20 NOTE — OB PROVIDER H&P - ASSESSMENT
Assessment  38yo  @21w4d presents for evaluation of short cervix. Patient counseled on management options, would like to proceed with cervical cerclage placement. Maternal and fetal status reassuring.    Plan  1. UA, UCx, Urine GC/C, CBC, T&S  2. NPO at midnight  3. Added on to OR schedule for cervical cerclage tomorrow morning  4. Patient consented for admission and cerclage placement; all questions answered    Patient discussed with attending physician, Dr. Sterling and MFM fellow Dr. Antoine PGY6.  Dara Miguel MD PGY4 Assessment  38yo  @21w4d presents for evaluation of short cervix. Patient counseled on management options, would like to proceed with cervical cerclage placement. Maternal and fetal status reassuring.    Plan  1. UA, UCx, Urine GC/C, CBC, T&S  2. NPO at midnight  3. Added on to OR schedule for cervical cerclage tomorrow morning  4. Patient consented for admission and cerclage placement; all questions answered    Patient discussed with attending physician, Dr. Sterling and MFM fellow Dr. Antoine PGY6.  Dara Miguel MD PGY4      ATTG  MFM input appreciated.  40YO P0 admitted@21+wks GA with short cervix admitted for U/S indicated cerclage by [facility] tomorrow.

## 2024-03-20 NOTE — CONSULT NOTE ADULT - ASSESSMENT
Assessment: 38 YO  at 21w4d with known short cervix previously on vaginal progesterone sent in from anatomy ultrasound with progressive cervical shortening (8 mm). Patient is asymptomatic. Patient is afebrile, non-tachycardic and normotensive. No tenderness to abdominal palpation and cervix is visually closed. Normal WBC count. No evidence of intra-amniotic infection. Patient is a candidate for an ultrasound indicated cerclage.    Counseling: Plan of care was reviewed with patient, patient's sister  (in person) and patient's sister Birgit (over the phone).  -Patient is at increased risk of  delivery in light of the short cervix  -Management options include expectant management, interruption of pregnancy (patient declines), resuming vaginal progesterone, cervical cerclage or both cervical cerclage and vaginal progesterone  -Intervention (vaginal progesterone and/or cerclage) decrease the rate of  labor compared to expectant management. The combination of vaginal progesterone and cerclage compared to vaginal progesterone alone has been shown to decrease the rate of  delivery and is associated with more advanced gestational age at the time of delivery.  -Even with vaginal progesterone and/or cerclage, patient remains at risk for periviable or  delivery  -Risks of cerclage placement include bleeding (patient accepts blood transfusion), infection, pain, damage to neighboring structures (bladder, ureters, rectum, blood vessels), PPROM, and  labor.   -If patient were to develop intraamniotic infection or go into  labor at this gestational age, this would result in a pregnancy loss     Patient and family voiced understand. Patient would like to proceed with cervical cerclage and vaginal progesterone. Surgical consent form was signed and witnessed.    Recommendations:  -UCx, Urine GC/CT  -NPO at midnight  -For cervical cerclage 3/21/24 at 8:30 AM  -Will plan to reinitiate vaginal progesterone the evening of 3/22  -Pelvic rest and return precautions reviewed with patient    Jovanna Antoine MD  [facility] Fellow  Attg: Dr. Ovalle

## 2024-03-20 NOTE — OB RN TRIAGE NOTE - FALL HARM RISK - UNIVERSAL INTERVENTIONS
Bed in lowest position, wheels locked, appropriate side rails in place/Call bell, personal items and telephone in reach/Instruct patient to call for assistance before getting out of bed or chair/Non-slip footwear when patient is out of bed/[place] to call system/Physically safe environment - no spills, clutter or unnecessary equipment/Purposeful Proactive Rounding/Room/bathroom lighting operational, light cord in reach

## 2024-03-20 NOTE — CONSULT NOTE ADULT - SUBJECTIVE AND OBJECTIVE BOX
M Consult Note:    HPI 40 YO  at 21w4d with known short cervix previously on vaginal progesterone sent in from anatomy ultrasound with progressive cervical shortening.     Patient was diagnosed with short cervix (2.1 cm) in the setting of vaginal bleeding at 14 weeks and started on vaginal progesterone at that time. On last ultrasound 24, cervix found to be 3.1 cm, and patient discontinued vaginal progesterone at this time. On anatomy ultrasound today, cervix found to be 8 mm, so patient was sent to the hospital for evaluation.    Patient denies fevers/chills, abdominal pain, cramping/contractions, vaginal bleeding, or leakage of fluid. Patient reports normal fetal movement.     Past obstetric history: 2022: SAB x1 s/p D&C complicated by delayed bleeding and persistently elevated bHCG concerning for heterotopic pregnancy medically managed with methotrexate  Past medical history: Denies  Past surgical history: D&C  Medications: Prenatal vitamin  Allergies: NKDA    Objective:  Vitals:  T(C): 36.9 (20 Mar 2024 18:43), Max: 36.9 (20 Mar 2024 18:43)  T(F): 98.4 (20 Mar 2024 18:43), Max: 98.4 (20 Mar 2024 18:43)  HR: 89 (20 Mar 2024 18:43) (75 - 115)  BP: 131/75 (20 Mar 2024 18:43) (118/64 - 131/75)  RR: 18 (20 Mar 2024 18:43) (16 - 18)  SpO2: 100% (20 Mar 2024 18:43) (91% - 100%)    O2 Parameters below as of 20 Mar 2024 18:43  Patient On (Oxygen Delivery Method): room air    Physical exam:  General: resting comfortably in bed  Cardiac: regular rate  Pulm: breathing comfortably on room air  Abdomen: soft, non-tender, gravid  SSE: cervix visually closed (pinpoint os), physiologic discharge    Tocometry: acontractile    Labs:  Type & Screen: O-, antibody screen positive                11.6   4.94  )-----------( 161      ( 20 Mar 2024 18:51 )             35.2       Urinalysis Basic - ( 20 Mar 2024 18:51 )    Color: Yellow / Appearance: Clear / S.013 / pH: x  Gluc: x / Ketone: 15 mg/dL  / Bili: Negative / Urobili: 0.2 mg/dL   Blood: x / Protein: Negative mg/dL / Nitrite: Negative   Leuk Esterase: Negative / RBC: x / WBC x   Sq Epi: x / Non Sq Epi: x / Bacteria: x    Imaging:  (Ultrasound 3/20/24)  Fetal Evaluation     Num Of Fetuses:          1   Fetal Heart Rate(bpm):   161   Cardiac Activity:        Normal   Presentation:            Vertex   Placenta:                Anterior   P. Cord Insertion:       Normal     Amniotic Fluid   ALESSIA FV:      Within normal limits                                 Largest Pocket(cm)                               4  ----------------------------------------------------------------------  Biometry   BPD:      54.3  mm     G.Age:   22w 4d   OFD:      64.7  mm   HC:      189.5  mm     G.Age:   21w 2d   AC:      158.6  mm     G.Age:   21w 0d   FL:       36.8  mm     G.Age:   21w 5d   HUM:      39.1  mm     G.Age:   24w 0d   CER:      23.2  mm     G.Age:   21w 4d   NFT:       4.9  mm   LV:       5.53  mm   CM:       5.28  mm   CI:          83.9  %       70 - 86   FL/HC:       19.4  %       18.4 - 20.2   HC/AC:       1.19          1.06 - 1.25   FL/BPD:      67.8  %       71 - 87   FL/AC:       23.2  %       20 - 24     Est. FW:     418   gm   0 lb 15 oz  ----------------------------------------------------------------------  OB History     :    1         Term:   0        Dejon:   0        SAB:   0   TOP:          0       Ectopic:  0        Livin  ----------------------------------------------------------------------  Gestational Age     LMP:           24w 3d      Date:  10/1/23            GOMEZ:   24   U/S Today:     21w 5d                                GOMEZ:   24   Best:          21w 4d   Det. By:  Early Ultrasound   GOMEZ:   24  ----------------------------------------------------------------------  2nd Trimester Genetic Sonogram - Trisomy 21 Screening     Age At GOMEZ:    39                                  Risk=1:   145            NB:       6.8  mm        35   %           > 1  MoM         NFT:       4.9  mm                Echogenic cardiac focus:   No      LR :  0.8        Ventriculomegaly of >= 10 mm:    No      LR :  0.94       Nuchal fold thickening >= 6 mm:   No      LR :  0.8                      Echogenic bowel:   No      LR :  0.9                   Mild hydronephrosis:  No      LR :  0.92                        Short humerus:   No      LR :  0.74                           Short femur:  No      LR :  0.8        Hypoplastic/absent Nasal bone:   No      LR :  0.46     8 Of 8 Criteria Were Visualized and 0 Abnormal(s) Were Seen.   Ultrasound Modified Risk for Fetal Down Syndrome = 1:1069     NIPS low risk ; MS-AFP low risk  ----------------------------------------------------------------------  Targeted Anatomy     Central Nervous System   Calvarium/Cranial V.:  Within Normal Limits   Intracranial Linda:     Within Normal Limits   Cavum:                 Within normal limits   Parenchyma:            Within Normal Limits   Lateral Ventricles:    Within Normal Limits   Choroid Plexus:        Within Normal Limits   Cereb./Vermis:         Within Normal Limits   Cisterna Magna:        Within Normal Limits   Midline Falx:          Within Normal Limits   3rd Ventricle:         Within Normal Limits   4th Ventricle:         Within Normal Limits     Spine   Cervical:              Within Normal Limits   Thoracic:              Within Normal Limits   Lumbar:                Within Normal Limits   Sacral:                Within Normal Limits   Shape/Curvature:       Within Normal Limits     Head/Neck   Face:                  Within Normal Limits   Lips:                  Within Normal Limits   Neck:                  Within Normal Limits   Nuchal Fold:           Within Normal Limits   Nasal Bone:            Present   Palate:                Hard palate is normal   Profile:               Within Normal Limits   Orbits/Eyes:           Within Normal Limits   Mandible:              Within Normal Limits   Maxilla:               Within Normal Limits     Thorax   Thoracic Contour:      Within Normal LImits   Lungs:                 Within Normal Limits   4 Chamber View:        Within Normal Limits   Cardiac Activity:      Seen   Cardiac Rhythm:        Within Normal lImits   Cardiac Situs:         Within Normal Limits   Rt Outflow Tract:      Within Normal Limits   Lt Outflow Tract:      Within Normal Limits   Aortic Arch:           Within Normal Limits   Ductal Arch:           Within normal limits   SVC:                   Within Normal Limits   Interventr. Septum:    Within Normal Limits   Cardiac Axis:          Within Normal Limits   Diaphragm:             Within Normal limits   3 Vessel View:         Within Normal Limits   3 V Trachea View:      Within Normal Limits   IVC:                   Within Normal Limits   Crossing:              Visualized     Abdomen   Ventral Wall:          Within Normal Limits   Cord Insertion:        Within Normal Limits   Situs:                 Within Normal Limits   Stomach:               Within Normal Limits   Lt Kidney:             Within Normal Limits   Rt Kidney:             Within Normal Limits   Bladder:               Within Normal Limits   Bowel:                 Within Normal Limits     Extremities   Lt Humerus:            Within Normal Limits   Rt Humerus:            Within Normal Limits   Lt Forearm:            Within Normal Limits   Rt Forearm:            Within Normal Limits   Lt Hand:               LIMITED VIEWS   Rt Hand:               LIMITED VIEWS   Lt Femur:              Within Normal limits   Rt Femur:              Within Normal limits   Lt Lower Leg:          Within Normal Limits   Rt Lower Leg:          Within Normal Limits   Lt Foot:               Within Normal Limits   Rt Foot:               Within Normal Limits     Other   Umbilical Cord:        Within Normal Limits   Genitalia:             Within Normal Limits  ----------------------------------------------------------------------  Cervix Uterus Adnexa     Cervix   Length:            0.8  cm.   see comment     Right Ovary   Not seen due to overlying bowel     Left Ovary   Not seen due to overlying bowel  ----------------------------------------------------------------------  Myomas     Site                 L(cm)     W(cm)     D(cm)     Location   Anterior             3.7       2.5       3.6  ----------------------------------------------------------------------     Blood Flow              RI       PI        Comments    ----------------------------------------------------------------------  Comments     Fetal biometry is consistent with assigned GOMEZ by   early ultrasound.   Limited views of the open fetal hands; Fetal anatomical   survey appears otherwise  normal for this gestational   age. There are limitations of ultrasound in detection of   all fetal anomalies/genetic syndromes.     Transvaginal ultrasound was performed in conjunction   with a transabdominal ultrasound to better visualize the   cervix;  patient on vaginal Progesterone.   There is funneling  of the cervix with the closed cervical   length measuring 8-10 mm at rest, maximal width   funnel approximately 1.2 cm, no significant   change   with applied pressure.     Discussed with Dr. Perez and Dr. Ovalle. Patient   sent to [facility] L&D for further evaluation.  ----------------------------------------------------------------------  Recommendations     Follow up 1 week for completiotn fetal anatomy and   cervical assessment  ----------------------------------------------------------------------                        Debbie Wolff  Electronically Signed Final Report   3/20/2024 03:24 pm  ----------------------------------------------------------------------

## 2024-03-20 NOTE — OB PROVIDER H&P - NSHPPHYSICALEXAM_GEN_ALL_CORE
Objective  – Vital Signs  Vital Signs Last 24 Hrs  T(C): 36.8 (20 Mar 2024 16:03), Max: 36.8 (20 Mar 2024 15:58)  T(F): 98.24 (20 Mar 2024 16:03), Max: 98.24 (20 Mar 2024 16:03)  HR: 86 (20 Mar 2024 16:48) (75 - 96)  BP: 129/64 (20 Mar 2024 16:03) (129/64 - 129/64)  BP(mean): --  RR: 16 (20 Mar 2024 16:03) (16 - 16)  SpO2: 99% (20 Mar 2024 16:48) (98% - 100%)    Parameters below as of 20 Mar 2024 15:58  Patient On (Oxygen Delivery Method): room air    – PE:   CV: RRR  Pulm: breathing comfortably on RA  Abd: gravid, nontender  Extr: moving all extremities with ease  – Spec: no pooling or bleeding, cervix visually closed  – Oberon: not basia  – Sono: vertex, grossly normal movement, MVP 6.1, FHR 157bpm
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
## 2024-03-21 ENCOUNTER — TRANSCRIPTION ENCOUNTER (OUTPATIENT)
Age: 39
End: 2024-03-21

## 2024-03-21 VITALS
SYSTOLIC BLOOD PRESSURE: 133 MMHG | HEART RATE: 96 BPM | OXYGEN SATURATION: 100 % | DIASTOLIC BLOOD PRESSURE: 74 MMHG | TEMPERATURE: 99 F | RESPIRATION RATE: 18 BRPM

## 2024-03-21 LAB
C TRACH RRNA SPEC QL NAA+PROBE: SIGNIFICANT CHANGE UP
CULTURE RESULTS: SIGNIFICANT CHANGE UP
N GONORRHOEA RRNA SPEC QL NAA+PROBE: SIGNIFICANT CHANGE UP
SPECIMEN SOURCE: SIGNIFICANT CHANGE UP

## 2024-03-21 PROCEDURE — 86077 PHYS BLOOD BANK SERV XMATCH: CPT

## 2024-03-21 PROCEDURE — 86870 RBC ANTIBODY IDENTIFICATION: CPT

## 2024-03-21 PROCEDURE — 86850 RBC ANTIBODY SCREEN: CPT

## 2024-03-21 PROCEDURE — 86901 BLOOD TYPING SEROLOGIC RH(D): CPT

## 2024-03-21 PROCEDURE — 59320 REVISION OF CERVIX: CPT

## 2024-03-21 PROCEDURE — 86900 BLOOD TYPING SEROLOGIC ABO: CPT

## 2024-03-21 PROCEDURE — 81003 URINALYSIS AUTO W/O SCOPE: CPT

## 2024-03-21 PROCEDURE — 87491 CHLMYD TRACH DNA AMP PROBE: CPT

## 2024-03-21 PROCEDURE — 87086 URINE CULTURE/COLONY COUNT: CPT

## 2024-03-21 PROCEDURE — 85025 COMPLETE CBC W/AUTO DIFF WBC: CPT

## 2024-03-21 PROCEDURE — 87591 N.GONORRHOEAE DNA AMP PROB: CPT

## 2024-03-21 RX ORDER — ACETAMINOPHEN 500 MG
1000 TABLET ORAL ONCE
Refills: 0 | Status: COMPLETED | OUTPATIENT
Start: 2024-03-21 | End: 2024-03-21

## 2024-03-21 RX ORDER — PROGESTERONE 200 MG/1
1 CAPSULE, LIQUID FILLED ORAL
Refills: 0 | DISCHARGE

## 2024-03-21 RX ORDER — ONDANSETRON 8 MG/1
4 TABLET, FILM COATED ORAL ONCE
Refills: 0 | Status: COMPLETED | OUTPATIENT
Start: 2024-03-21 | End: 2024-03-21

## 2024-03-21 RX ORDER — ACETAMINOPHEN 500 MG
975 TABLET ORAL ONCE
Refills: 0 | Status: COMPLETED | OUTPATIENT
Start: 2024-03-21 | End: 2024-03-21

## 2024-03-21 RX ORDER — PROGESTERONE 200 MG/1
1 CAPSULE, LIQUID FILLED ORAL
Qty: 1 | Refills: 3
Start: 2024-03-21 | End: 2024-07-18

## 2024-03-21 RX ORDER — INDOMETHACIN 50 MG
50 CAPSULE ORAL ONCE
Refills: 0 | Status: COMPLETED | OUTPATIENT
Start: 2024-03-21 | End: 2024-03-21

## 2024-03-21 RX ORDER — INDOMETHACIN 50 MG
1 CAPSULE ORAL
Qty: 7 | Refills: 0
Start: 2024-03-21

## 2024-03-21 RX ORDER — OXYCODONE HYDROCHLORIDE 5 MG/1
5 TABLET ORAL ONCE
Refills: 0 | Status: DISCONTINUED | OUTPATIENT
Start: 2024-03-21 | End: 2024-03-21

## 2024-03-21 RX ORDER — INDOMETHACIN 50 MG
25 CAPSULE ORAL ONCE
Refills: 0 | Status: COMPLETED | OUTPATIENT
Start: 2024-03-21 | End: 2024-03-21

## 2024-03-21 RX ORDER — SODIUM CHLORIDE 9 MG/ML
1000 INJECTION, SOLUTION INTRAVENOUS ONCE
Refills: 0 | Status: DISCONTINUED | OUTPATIENT
Start: 2024-03-21 | End: 2024-03-21

## 2024-03-21 RX ADMIN — Medication 50 MILLIGRAM(S): at 14:24

## 2024-03-21 RX ADMIN — Medication 400 MILLIGRAM(S): at 11:55

## 2024-03-21 RX ADMIN — ONDANSETRON 4 MILLIGRAM(S): 8 TABLET, FILM COATED ORAL at 16:22

## 2024-03-21 RX ADMIN — Medication 50 MILLIGRAM(S): at 10:44

## 2024-03-21 RX ADMIN — Medication 1000 MILLIGRAM(S): at 14:24

## 2024-03-21 RX ADMIN — Medication 25 MILLIGRAM(S): at 16:53

## 2024-03-21 RX ADMIN — OXYCODONE HYDROCHLORIDE 5 MILLIGRAM(S): 5 TABLET ORAL at 14:57

## 2024-03-21 RX ADMIN — Medication 975 MILLIGRAM(S): at 16:53

## 2024-03-21 NOTE — OB RN PREOPERATIVE CHECKLIST - ALLERGIES REVIEWED
We will change your CT scans to be done as soon as possible and I will see you for the results.    I sent a prescription for gabapentin to your pharmacy.    Please call and make an appointment with surgery for your repeat colonoscopy.    If you have any questions please call [phone]    Other instructions:      Start gabapentin as instructed.  Continue the cymbalta twice a day.  Continue with the iron infusions.  Continue with B12 injections.  Continue with the Vitmamin D.  Remember to take your thyroid medication on an empty stomach at least 1/2-hour prior to any other medications.   done

## 2024-03-21 NOTE — CHART NOTE - NSCHARTNOTEFT_GEN_A_CORE
MFM Brief Progress Note    38 YO  at 21w5d with known short cervix previously on vaginal progesterone sent in from anatomy ultrasound with progressive cervical shortening (8 mm). Patient continues to be asymptomatic. Patient is afebrile, non-tachycardic and normotensive. On initial evaluation 3/20/24, cervix visually closed on SSE. Normal WBC count, negative urinalysis. No evidence of intra-amniotic infection. Patient is a candidate for an ultrasound indicated cerclage and patient affirms desire to proceed with scheduled cerclage this moring. On call to OR. For 1 g ancef for antibiotic prophylaxis, sequential compresssion devices for VTE prophylaxis, and will start indocin x48 hours post-procedurally. Will resume vaginal progesterone the evening of 3/22/24. Will arrange follow-up cervical length in 1 week.    Jovanna Antoine MD  [facility] Fellow  Attg: Dr. Ovalle

## 2024-03-21 NOTE — DISCHARGE NOTE ANTEPARTUM - CARE PROVIDER_API CALL
Yeimi Jay  Obstetrics and Gynecology  [address] [address]  Phone: [phone]  Fax: [phone]  Follow Up Time:    Yeimi Jay  Obstetrics and Gynecology  [address] [facility] [facility] [address]  [place] [address]  Phone: [phone]  Fax: [phone]  Follow Up Time:     [address],   Thursday 3/28/24 at 11:30am follow up appointment  Phone: (   )    -  Fax: (   )    -  Follow Up Time:

## 2024-03-21 NOTE — PROVIDER CONTACT NOTE (OTHER) - SITUATION
s/p cerclage placement @1000, pt monitored closely for uterine cramping, nausea, and possible leakage of fluid, which have all resolved at this time.

## 2024-03-21 NOTE — DISCHARGE NOTE ANTEPARTUM - PROVIDER TOKENS
PROVIDER:[TOKEN:[[id number]]] PROVIDER:[TOKEN:[[id number]]],FREE:[LAST:[[address]],PHONE:[(   )    -],FAX:[(   )    -],ADDRESS:[Thursday 3/28/24 at 11:30am follow up appointment]]

## 2024-03-21 NOTE — BRIEF OPERATIVE NOTE - NSICDXBRIEFPROCEDURE_GEN_ALL_CORE_FT
PROCEDURES:  María Elena cerclage of cervix during pregnancy by vaginal approach 21-Mar-2024 10:38:34  Jovanna Antoine

## 2024-03-21 NOTE — OB RN INTRAOPERATIVE NOTE - NSFROZENSECTION1_OBGYN_ALL_OB
Spoke with patient. Instructed patient to call insurance company and find out what the alternatives are for the one touch delica lancets. Patient agreed to do so and will call clinic back with response.    No

## 2024-03-21 NOTE — OB RN INTRAOPERATIVE NOTE - NSOBSELHIDDEN_OBGYN_ALL_OB_FT
[NSOBAttendingProcedure1_OBGYN_ALL_OB_FT:[id number]=],[NSRNCirculatorProcedure1_OBGYN_ALL_OB_FT:[id number]==] [NSOBAttendingProcedure1_OBGYN_ALL_OB_FT:[email or web address]=],[NSRNCirculatorProcedure1_OBGYN_ALL_OB_FT:[id number]==],[NS2ndAttendingProcedure1_OBGYN_ALL_OB_FT:[id number]=]

## 2024-03-21 NOTE — PROGRESS NOTE ADULT - ASSESSMENT
38 yo  @21w5d presented with short cervix seen on US at ATU on 3/20, admitted for cerclage placement on 3/21. Fetal and maternal status reassuring.     #Cervical insufficiency   - For cerclage this AM   - Will restart vaginal progesterone ~24h after procedure   - NPO, LR@125    #Fetal wellbeing  - BPP (3/20):     #Maternal wellbeing  - NPO  - SCDs for DVT ppx  - PNV    Ibeth Willis, PGY3

## 2024-03-21 NOTE — PROGRESS NOTE ADULT - SUBJECTIVE AND OBJECTIVE BOX
Patient seen and examined at bedside, no acute overnight events. No acute complaints. Patient endorses good fetal movement. Patient is ambulating and tolerating regular diet. Denies CP, SOB, N/V, fevers, chills, or any other concerns.    Vital Signs Last 24 Hours  T(C): 36.5 (03-21-24 @ 05:35), Max: 36.9 (03-20-24 @ 18:43)  HR: 80 (03-21-24 @ 05:35) (75 - 115)  BP: 104/57 (03-21-24 @ 05:35) (104/57 - 131/75)  RR: 18 (03-21-24 @ 05:35) (16 - 18)  SpO2: 99% (03-21-24 @ 05:35) (91% - 100%)    I&O's Summary      Physical Exam:  General: NAD  CV: RR  Lungs: breathing comfortably on RA  Abdomen: soft, gravid, non-tender  Ext: no pain or swelling    Labs:             11.6   4.94  )-----------( 161      ( 03-20 @ 18:51 )             35.2         MEDICATIONS  (STANDING):  influenza   Vaccine 0.5 milliLiter(s) IntraMuscular once  prenatal multivitamin 1 Tablet(s) Oral daily    MEDICATIONS  (PRN):

## 2024-03-21 NOTE — DISCHARGE NOTE ANTEPARTUM - NS MD DC FALL RISK RISK
For information on Fall & Injury Prevention, visit: [email or web address] OR  [email or web address] OR  https://www.cdc.gov/steadi/patient.html

## 2024-03-21 NOTE — BRIEF OPERATIVE NOTE - NSICDXBRIEFPREOP_GEN_ALL_CORE_FT
PRE-OP DIAGNOSIS:  Short cervix during pregnancy in second trimester 21-Mar-2024 10:39:38  Jovanna Antoine

## 2024-03-21 NOTE — BRIEF OPERATIVE NOTE - NSICDXBRIEFPOSTOP_GEN_ALL_CORE_FT
POST-OP DIAGNOSIS:  Short cervix during pregnancy in second trimester 21-Mar-2024 10:39:23  Jovanna Antoine cerclage present in second trimester 21-Mar-2024 10:40:37  Jovanna Antoine

## 2024-03-21 NOTE — DISCHARGE NOTE ANTEPARTUM - MEDICATION SUMMARY - MEDICATIONS TO STOP TAKING
I will STOP taking the medications listed below when I get home from the hospital:     mg oral tablet  -- 1 tab(s) by mouth every 8 hours, As Needed -for severe pain  -- Do not take this drug if you are pregnant.  It is very important that you take or use this exactly as directed.  Do not skip doses or discontinue unless directed by your doctor.  May cause drowsiness or dizziness.  Obtain medical advice before taking any non-prescription drugs as some may affect the action of this medication.  Take with food or milk.    acetaminophen 325 mg oral tablet  -- 3 tab(s) by mouth every 6 hours, As Needed - 3)

## 2024-03-21 NOTE — DISCHARGE NOTE ANTEPARTUM - MEDICATION SUMMARY - MEDICATIONS TO TAKE
I will START or STAY ON the medications listed below when I get home from the hospital:    indomethacin 25 mg oral capsule  -- 1 cap(s) by mouth every 6 hours  -- Indication: For Cervical Insufficiency    progesterone 200 mg vaginal suppository  -- 1 suppository(ies) intravaginally once a day (at bedtime)  -- Indication: For Cervical Insufficiency

## 2024-03-21 NOTE — OB RN INTRAOPERATIVE NOTE - NSRNCIRCULATORPROCEDURE1_OBGYN_ALL_OB_FT
Emergency Department Sign Out Note        Sign out and transfer of care from Dr Teresa Fuller See Separate Emergency Department note  The patient, Logan Smith, was evaluated by the previous provider for psych eval     Workup Completed:  Psych clearance labs ordered    ED Course / Workup Pending (followup): Patient medically cleared by previous provider, 201 signed, patient signed out to me pending bed placement  No acute issues overnight  Patient signed out to Dr Pritesh Veliz pending bed placement  Procedures  MDM        Disposition  Final diagnoses:   None     ED Disposition     None      MD Documentation    Flowsheet Row Most Recent Value   Sending MD [place]      Follow-up Information    None       Patient's Medications   Discharge Prescriptions    No medications on file     No discharge procedures on file         ED Provider  Electronically Signed by     Bayron Jacobson MD  05/20/23 2330
Margie Kaye - CLAUDIA

## 2024-03-21 NOTE — PROVIDER CONTACT NOTE (OTHER) - ASSESSMENT
pt VS wnl -120/ 60-70 HR 90's, oral temp 37.3, no bleeding or fluid noted on pad, pt denies leakage of fluid, pt denies pain and nausea at this time.

## 2024-03-21 NOTE — DISCHARGE NOTE ANTEPARTUM - CARE PROVIDERS DIRECT ADDRESSES
,[email or web address]_Unknown

## 2024-03-21 NOTE — PRE-ANESTHESIA EVALUATION ADULT - NSANTHADDINFOFT_GEN_ALL_CORE
Spinal anesthesia explained to pt in detail;  risks include but not limited to HA, failure, nv injury.  All questions answered.

## 2024-03-21 NOTE — CHART NOTE - NSCHARTNOTEFT_GEN_A_CORE
At bedside due to pt feeling wetness under her on the pad beneath her.   Pt states she did not feel leakage, however, felt the pad beneath her with her hand and noticed that it was wet. Pt endorsing cramping. Slightly helped by indocin and IV tylenol.     Speculum: Cervix visually closed, approx 5cc of blood in posterior vault. Nitrazine positive in the setting of blood.   Ferning negative     A/P: 21w5d s/p cerclage placement due to short cervix of 8-10mm on US. Pt feeling cramping and felt the pad underneath her was wet. Speculum exam equivocal with positive nitrazine (in the presence of blood), ferning negative, pooling equivocal (blood in vault, slightly watery).     - When patient is more awake, will have her ambulate and wear a pad. Will continue to monitor for continued leakage.   - Will repeat speculum exam if further symptoms  - Offered heat packs. Pt still very sleepy from versed received intraop, when more awake could offer oxycodone for further pain control if required.     Ibeth Willis, PGY3  Dr. Antoine MFM fellow at bedside At bedside due to pt feeling wetness under her on the pad beneath her.   Pt states she did not feel leakage, however, felt the pad beneath her with her hand and noticed that it was wet. Pt endorsing cramping. Slightly helped by indocin and IV tylenol.     Speculum: Cervix visually closed, approx 5cc of blood in posterior vault. Nitrazine positive in the setting of blood.   Ferning negative   Bedside ultrasound: , MVP 7.5cm  Bladder appears empty on ultrasound     A/P: 21w5d s/p cerclage placement due to short cervix of 8-10mm on US. Pt feeling cramping and felt the pad underneath her was wet. Speculum exam equivocal with positive nitrazine (in the presence of blood), ferning negative, pooling equivocal (blood in vault, slightly watery).     - When patient is more awake, will have her ambulate and wear a pad. Will continue to monitor for continued leakage.   - Will repeat speculum exam if further symptoms  - Offered heat packs. Pt still very sleepy from versed received intraop, when more awake could offer oxycodone for further pain control if required.     Ibeth Willis, PGY3  Dr. Antoine MFM fellow at bedside At bedside due to pt feeling wetness under her on the pad beneath her.   Pt states she did not feel leakage, however, felt the pad beneath her with her hand and noticed that it was wet. Pt endorsing cramping. Slightly helped by indocin and IV tylenol.     Speculum: Cervix visually closed, approx 5cc of blood in posterior vault. Nitrazine positive in the setting of blood.   Ferning negative   Bedside ultrasound: , MVP 7.5cm  Bladder appears empty on ultrasound     A/P: 21w5d s/p cerclage placement due to short cervix of 8-10mm on US. Pt feeling cramping and felt the pad underneath her was wet. Speculum exam equivocal with positive nitrazine (in the presence of blood), ferning negative, pooling equivocal (blood in vault, slightly watery).     - When patient is more awake, will have her ambulate and wear a pad. Will continue to monitor for continued leakage.   - Will repeat speculum exam if further symptoms  - Offered heat packs. Pt still very sleepy from versed received intraop, when more awake could offer oxycodone for further pain control if required.     Ibeth Willis, PGY3  Dr. Antoine MFM fellow at bedside    Patient seen at bedside with Dr. Willis. Agree with documentation as above.    Jovanna Antoine MD  MFM Fellow At bedside due to pt feeling wetness under her on the pad beneath her.   Pt states she did not feel leakage, however, felt the pad beneath her with her hand and noticed that it was wet. Pt endorsing cramping. Slightly helped by indocin and IV tylenol.     Speculum: Cervix visually closed, approx 5cc of blood in posterior vault. Nitrazine positive in the setting of blood.   Ferning negative   Bedside ultrasound: , MVP 7.5cm  Bladder appears empty on ultrasound     A/P: 21w5d s/p cerclage placement due to short cervix of 8-10mm on US. Pt feeling cramping and felt the pad underneath her was wet. Speculum exam equivocal with positive nitrazine (in the presence of blood), ferning negative, pooling equivocal (blood in vault, slightly watery).     - When patient is more awake, will have her ambulate and wear a pad. Will continue to monitor for continued leakage.   - Will repeat speculum exam if further symptoms  - Offered heat packs. Pt still very sleepy from versed received intraop, when more awake could offer oxycodone for further pain control if required.     Ibeth Willis, PGY3  Dr. Antoine MFM fellow at bedside    I was present for and agree with the above initial evaluation at 12:25 PM.   I additionally reevaluated the patient at 2:15 PM.   RN noted wet-appearing labia and ongoing pain, which prompted evaluation. At bedside, patient reports feeling very hungry and reports 8/10 pain. She denies feeling any leakage of fluid. On external vulva exam, labia appear wet, similar to first exam, and no active leakage of fluid. Plan for apple juice, crackers and oxycodone. Intraoperative and post-operative course to date reviewed in detail with patient now that she is no longer drowsy from midazolam. All questions answered.    Jovanna Antoine MD  MFM Fellow At bedside due to pt feeling wetness under her on the pad beneath her.   Pt states she did not feel leakage, however, felt the pad beneath her with her hand and noticed that it was wet. Pt endorsing cramping. Slightly helped by indocin and IV tylenol.     Speculum: Cervix visually closed, approx 5cc of blood in posterior vault. Nitrazine positive in the setting of blood.   Ferning negative   Bedside ultrasound: , MVP 7.5cm  Bladder appears empty on ultrasound     A/P: 21w5d s/p cerclage placement due to short cervix of 8-10mm on US. Pt feeling cramping and felt the pad underneath her was wet. Speculum exam equivocal with positive nitrazine (in the presence of blood), ferning negative, pooling equivocal (blood in vault, slightly watery).     - When patient is more awake, will have her ambulate and wear a pad. Will continue to monitor for continued leakage.   - Will repeat speculum exam if further symptoms  - Offered heat packs. Pt still very sleepy from versed received intraop, when more awake could offer oxycodone for further pain control if required.     Ibeth Willis, PGY3  Dr. Antoine M fellow at bedside    I was present for and agree with the above initial evaluation at 12:25 PM.   I additionally reevaluated the patient at 2:15 PM.   RN noted wet-appearing labia and ongoing pain, which prompted evaluation. At bedside, patient reports feeling very hungry and reports 8/10 pain. She denies feeling any leakage of fluid. On external vulva exam, labia appear wet, similar to first exam, and no active leakage of fluid. Plan for apple juice, crackers and oxycodone. Intraoperative and post-operative course to date reviewed in detail with patient now that she is no longer drowsy from midazolam. All questions answered.    Jovanna Antoine MD  M Fellow    Addendum 5:00 PM:  Patient seen at bedside for repeat evaluation. Patient reports significant improvement in pain. She recently had an episode of emesis and received zofran. She has ambulated and voided. She denies any leakage of fluid since the initial evaluation for the finding of a wet jarrett. Low suspicion for rupture of membranes at this time given MVP 7.5 cm, ferning negative, and no leakage of fluid. Will consider repeat speculum exam if patient experiences symptoms concerning for rupture of membranes. Return precautions reviewed with patient. Has follow-up ultrasound scheduled 11/28/24 at 11:30 AM at [address].    Jovanna Antoine MD  MFM Fellow  D/w Dr. Ovalle, attg

## 2024-03-21 NOTE — DISCHARGE NOTE ANTEPARTUM - PLAN OF CARE
Continue Vaginal Progesterone  Take Indocin x 48 h    Resume normal prenatal care. Please call your doctor with any questions or concerns. Please report back to the hospital if you experience fevers, chills, nausea, vomiting, painful contractions, vaginal bleeding, loss of fluid or if you experience decreased fetal movement.

## 2024-03-21 NOTE — DISCHARGE NOTE ANTEPARTUM - HOSPITAL COURSE
38 yo  @21w5d presented with short cervix seen on US at [facility]U on 3/20, admitted for cerclage placement on 3/21. Fetal and maternal status reassuring.

## 2024-03-21 NOTE — BRIEF OPERATIVE NOTE - OPERATION/FINDINGS
Patulous appearing external os. Ring Cerclage placed with 1 prolene with knot at 12 o'clock. Cervix digitally closed upon completion of procedure. Fetal heart rate 130 bpm on postprocedure check. Patulous appearing external os, no membranes visualized. Ring Cerclage placed with 1 prolene with knot at 12 o'clock. Cervix digitally closed upon completion of procedure. Fetal heart rate 130 bpm on postprocedure check.    Dictation # [id number]

## 2024-03-21 NOTE — DISCHARGE NOTE ANTEPARTUM - PATIENT PORTAL LINK FT
You can access the FollowMyHealth Patient Portal offered by [facility] by registering at the following website: [email or web address]. By joining [facility] [employer] [facility]’s FollowMyHealth portal, you will also be able to view your health information using other applications (apps) compatible with our system.

## 2024-03-21 NOTE — DISCHARGE NOTE ANTEPARTUM - CARE PLAN
1 Principal Discharge DX:	Cervical insufficiency in pregnancy in second trimester, antepartum  Assessment and plan of treatment:	Continue Vaginal Progesterone  Take Indocin x 48 h    Resume normal prenatal care. Please call your doctor with any questions or concerns. Please report back to the hospital if you experience fevers, chills, nausea, vomiting, painful contractions, vaginal bleeding, loss of fluid or if you experience decreased fetal movement.

## 2024-03-22 ENCOUNTER — NON-APPOINTMENT (OUTPATIENT)
Age: 39
End: 2024-03-22

## 2024-03-28 ENCOUNTER — ASOB RESULT (OUTPATIENT)
Age: 39
End: 2024-03-28

## 2024-03-28 ENCOUNTER — APPOINTMENT (OUTPATIENT)
Dept: ANTEPARTUM | Facility: CLINIC | Age: 39
End: 2024-03-28
Payer: MEDICARE

## 2024-03-28 PROCEDURE — 76817 TRANSVAGINAL US OBSTETRIC: CPT

## 2024-03-28 PROCEDURE — 76816 OB US FOLLOW-UP PER FETUS: CPT

## 2024-04-08 ENCOUNTER — ASOB RESULT (OUTPATIENT)
Age: 39
End: 2024-04-08

## 2024-04-08 ENCOUNTER — APPOINTMENT (OUTPATIENT)
Dept: ANTEPARTUM | Facility: CLINIC | Age: 39
End: 2024-04-08
Payer: COMMERCIAL

## 2024-04-08 PROCEDURE — 76816 OB US FOLLOW-UP PER FETUS: CPT

## 2024-04-15 NOTE — ED ADULT NURSE NOTE - PRO INTERPRETER NEED 2
Kareem Rey  1941  Eduar Birmingham MD      Chief Complaint   Patient presents with    Atrial Fibrillation     Denies any chest pain, SOB      Hyperlipidemia    Hypertension     On norvasc,     Dizziness     Has had a few fall 5-6 times in last 2 months,     Swelling     To legs ankles     Loss of Consciousness     When he fell don't remember why. Feels that he passed out    Fatigue     Sleeps alot     Chief complaint and HPI:  Kareem Rey  is a 82 y.o. male following up from recent hospitalization and has chronic persistent atrial fibrillation sleep apnea known coronary artery disease and permanent pacemaker.  He had been in the hospital due to recurrent falls has been diagnosed to have severe neuropathy continues to drink alcohol but much less than before and he is in a wheelchair now but apparently he walks with a walker at home had been through physical therapy.  He is accompanied by a good neighbor and his wife and they seem to have good knowledge of his medications and medical conditions and needs.  Denies any new symptoms.  He lost 30+ pounds since last visit with me 6 months ago.    Rest of the Cardiovascular system review is otherwise unchanged from prior encounter.  Past medical history:  has a past medical history of Abnormal nuclear stress test, Arthritis, CAD s/p PCI to LAD+RCA 9/9/22, Dizziness, Fall, FH: CAD (coronary artery disease), H/O 24 hour EKG monitoring, H/O atrial fibrillation without current medication, H/O cardiovascular stress test, H/O Doppler echocardiogram, H/O Doppler ultrasound (Venous Doppler Lower Extremities), H/O echocardiogram, H/O transesophageal echocardiography (SALAZAR) for monitoring, Heart murmur, History of nuclear stress test, History of nuclear stress test, Hx of Doppler echocardiogram, Hx of Venous Doppler ultrasound, Hyperlipidemia, Hypertension, Kidney stones, Leg swelling, Neuropathy, Nonrheumatic aortic valve stenosis, Obesity, JAYE on CPAP, Persistent atrial  English

## 2024-05-08 ENCOUNTER — NON-APPOINTMENT (OUTPATIENT)
Age: 39
End: 2024-05-08

## 2024-05-08 ENCOUNTER — APPOINTMENT (OUTPATIENT)
Dept: ANTEPARTUM | Facility: CLINIC | Age: 39
End: 2024-05-08

## 2024-05-08 ENCOUNTER — ASOB RESULT (OUTPATIENT)
Age: 39
End: 2024-05-08

## 2024-05-08 ENCOUNTER — APPOINTMENT (OUTPATIENT)
Dept: ANTEPARTUM | Facility: CLINIC | Age: 39
End: 2024-05-08
Payer: COMMERCIAL

## 2024-05-08 ENCOUNTER — APPOINTMENT (OUTPATIENT)
Dept: OBGYN | Facility: CLINIC | Age: 39
End: 2024-05-08
Payer: COMMERCIAL

## 2024-05-08 VITALS
WEIGHT: 225.25 LBS | BODY MASS INDEX: 33.36 KG/M2 | DIASTOLIC BLOOD PRESSURE: 77 MMHG | HEIGHT: 69 IN | SYSTOLIC BLOOD PRESSURE: 130 MMHG

## 2024-05-08 PROCEDURE — 76819 FETAL BIOPHYS PROFIL W/O NST: CPT | Mod: 59

## 2024-05-08 PROCEDURE — 76816 OB US FOLLOW-UP PER FETUS: CPT

## 2024-05-08 PROCEDURE — 0502F SUBSEQUENT PRENATAL CARE: CPT

## 2024-05-08 RX ORDER — HUMAN RHO(D) IMMUNE GLOBULIN 300 UG/1
1500 INJECTION, SOLUTION INTRAMUSCULAR
Qty: 1 | Refills: 0 | Status: ACTIVE | COMMUNITY
Start: 2024-05-08 | End: 1900-01-01

## 2024-05-09 LAB
BASOPHILS # BLD AUTO: 0.01 K/UL
BASOPHILS NFR BLD AUTO: 0.2 %
EOSINOPHIL # BLD AUTO: 0.09 K/UL
EOSINOPHIL NFR BLD AUTO: 1.6 %
GLUCOSE 1H P 50 G GLC PO SERPL-MCNC: 123 MG/DL
HCT VFR BLD CALC: 31.2 %
HGB BLD-MCNC: 10.3 G/DL
HIV1+2 AB SPEC QL IA.RAPID: NONREACTIVE
IMM GRANULOCYTES NFR BLD AUTO: 0.4 %
LYMPHOCYTES # BLD AUTO: 1.39 K/UL
LYMPHOCYTES NFR BLD AUTO: 24.4 %
MAN DIFF?: NORMAL
MCHC RBC-ENTMCNC: 29.3 PG
MCHC RBC-ENTMCNC: 33 GM/DL
MCV RBC AUTO: 88.9 FL
MONOCYTES # BLD AUTO: 0.61 K/UL
MONOCYTES NFR BLD AUTO: 10.7 %
NEUTROPHILS # BLD AUTO: 3.57 K/UL
NEUTROPHILS NFR BLD AUTO: 62.7 %
PLATELET # BLD AUTO: 205 K/UL
RBC # BLD: 3.51 M/UL
RBC # FLD: 13.4 %
T PALLIDUM AB SER QL IA: NEGATIVE
WBC # FLD AUTO: 5.69 K/UL

## 2024-05-20 ENCOUNTER — NON-APPOINTMENT (OUTPATIENT)
Age: 39
End: 2024-05-20

## 2024-05-20 ENCOUNTER — APPOINTMENT (OUTPATIENT)
Dept: OBGYN | Facility: CLINIC | Age: 39
End: 2024-05-20
Payer: MEDICARE

## 2024-05-20 VITALS — SYSTOLIC BLOOD PRESSURE: 124 MMHG | DIASTOLIC BLOOD PRESSURE: 82 MMHG | BODY MASS INDEX: 33.76 KG/M2 | WEIGHT: 228.6 LBS

## 2024-05-20 DIAGNOSIS — Z29.13 ENCOUNTER FOR PROPHYLACTIC RHO(D) IMMUNE GLOBULIN: ICD-10-CM

## 2024-05-20 PROCEDURE — 0502F SUBSEQUENT PRENATAL CARE: CPT

## 2024-05-20 PROCEDURE — 96372 THER/PROPH/DIAG INJ SC/IM: CPT

## 2024-05-20 RX ADMIN — HUMAN RHO(D) IMMUNE GLOBULIN 0 UNIT: 300 INJECTION, SOLUTION INTRAMUSCULAR at 00:00

## 2024-06-05 ENCOUNTER — ASOB RESULT (OUTPATIENT)
Age: 39
End: 2024-06-05

## 2024-06-05 ENCOUNTER — APPOINTMENT (OUTPATIENT)
Dept: ANTEPARTUM | Facility: CLINIC | Age: 39
End: 2024-06-05
Payer: COMMERCIAL

## 2024-06-05 PROCEDURE — 76816 OB US FOLLOW-UP PER FETUS: CPT

## 2024-06-10 ENCOUNTER — NON-APPOINTMENT (OUTPATIENT)
Age: 39
End: 2024-06-10

## 2024-06-10 ENCOUNTER — APPOINTMENT (OUTPATIENT)
Dept: OBGYN | Facility: CLINIC | Age: 39
End: 2024-06-10
Payer: MEDICARE

## 2024-06-10 VITALS — BODY MASS INDEX: 34.56 KG/M2 | WEIGHT: 234 LBS | DIASTOLIC BLOOD PRESSURE: 75 MMHG | SYSTOLIC BLOOD PRESSURE: 135 MMHG

## 2024-06-10 PROCEDURE — 0502F SUBSEQUENT PRENATAL CARE: CPT

## 2024-06-16 ENCOUNTER — NON-APPOINTMENT (OUTPATIENT)
Age: 39
End: 2024-06-16

## 2024-06-24 ENCOUNTER — APPOINTMENT (OUTPATIENT)
Dept: OBGYN | Facility: CLINIC | Age: 39
End: 2024-06-24
Payer: MEDICARE

## 2024-06-24 VITALS — SYSTOLIC BLOOD PRESSURE: 132 MMHG | BODY MASS INDEX: 34.41 KG/M2 | DIASTOLIC BLOOD PRESSURE: 73 MMHG | WEIGHT: 233 LBS

## 2024-06-24 DIAGNOSIS — O09.513 SUPERVISION OF ELDERLY PRIMIGRAVIDA, THIRD TRIMESTER: ICD-10-CM

## 2024-06-24 PROCEDURE — 0502F SUBSEQUENT PRENATAL CARE: CPT

## 2024-06-30 ENCOUNTER — INPATIENT (INPATIENT)
Facility: HOSPITAL | Age: 39
LOS: 1 days | Discharge: HOME | DRG: 951 | End: 2024-07-02
Attending: OBSTETRICS & GYNECOLOGY | Admitting: OBSTETRICS & GYNECOLOGY
Payer: COMMERCIAL

## 2024-06-30 VITALS — DIASTOLIC BLOOD PRESSURE: 68 MMHG | HEART RATE: 94 BPM | OXYGEN SATURATION: 97 % | SYSTOLIC BLOOD PRESSURE: 129 MMHG

## 2024-06-30 DIAGNOSIS — O26.899 OTHER SPECIFIED PREGNANCY RELATED CONDITIONS, UNSPECIFIED TRIMESTER: ICD-10-CM

## 2024-06-30 DIAGNOSIS — Z34.80 ENCOUNTER FOR SUPERVISION OF OTHER NORMAL PREGNANCY, UNSPECIFIED TRIMESTER: ICD-10-CM

## 2024-06-30 LAB
ALBUMIN SERPL ELPH-MCNC: 3.5 G/DL — SIGNIFICANT CHANGE UP (ref 3.3–5)
ALP SERPL-CCNC: 137 U/L — HIGH (ref 40–120)
ALT FLD-CCNC: 27 U/L — SIGNIFICANT CHANGE UP (ref 10–45)
ANION GAP SERPL CALC-SCNC: 15 MMOL/L — SIGNIFICANT CHANGE UP (ref 5–17)
APPEARANCE UR: CLEAR — SIGNIFICANT CHANGE UP
AST SERPL-CCNC: 27 U/L — SIGNIFICANT CHANGE UP (ref 10–40)
BACTERIA # UR AUTO: NEGATIVE /HPF — SIGNIFICANT CHANGE UP
BASOPHILS # BLD AUTO: 0.02 K/UL — SIGNIFICANT CHANGE UP (ref 0–0.2)
BASOPHILS # BLD AUTO: 0.03 K/UL — SIGNIFICANT CHANGE UP (ref 0–0.2)
BASOPHILS NFR BLD AUTO: 0.3 % — SIGNIFICANT CHANGE UP (ref 0–2)
BASOPHILS NFR BLD AUTO: 0.4 % — SIGNIFICANT CHANGE UP (ref 0–2)
BILIRUB SERPL-MCNC: 0.4 MG/DL — SIGNIFICANT CHANGE UP (ref 0.2–1.2)
BILIRUB UR-MCNC: NEGATIVE — SIGNIFICANT CHANGE UP
BLD GP AB SCN SERPL QL: POSITIVE — SIGNIFICANT CHANGE UP
BUN SERPL-MCNC: 5 MG/DL — LOW (ref 7–23)
CALCIUM SERPL-MCNC: 9.2 MG/DL — SIGNIFICANT CHANGE UP (ref 8.4–10.5)
CAST: 2 /LPF — SIGNIFICANT CHANGE UP (ref 0–4)
CHLORIDE SERPL-SCNC: 104 MMOL/L — SIGNIFICANT CHANGE UP (ref 96–108)
CO2 SERPL-SCNC: 18 MMOL/L — LOW (ref 22–31)
COLOR SPEC: YELLOW — SIGNIFICANT CHANGE UP
CREAT ?TM UR-MCNC: 81 MG/DL — SIGNIFICANT CHANGE UP
CREAT SERPL-MCNC: 0.61 MG/DL — SIGNIFICANT CHANGE UP (ref 0.5–1.3)
DIFF PNL FLD: ABNORMAL
EGFR: 118 ML/MIN/1.73M2 — SIGNIFICANT CHANGE UP
EOSINOPHIL # BLD AUTO: 0.06 K/UL — SIGNIFICANT CHANGE UP (ref 0–0.5)
EOSINOPHIL # BLD AUTO: 0.06 K/UL — SIGNIFICANT CHANGE UP (ref 0–0.5)
EOSINOPHIL NFR BLD AUTO: 0.9 % — SIGNIFICANT CHANGE UP (ref 0–6)
EOSINOPHIL NFR BLD AUTO: 0.9 % — SIGNIFICANT CHANGE UP (ref 0–6)
GLUCOSE SERPL-MCNC: 90 MG/DL — SIGNIFICANT CHANGE UP (ref 70–99)
GLUCOSE UR QL: NEGATIVE MG/DL — SIGNIFICANT CHANGE UP
HCT VFR BLD CALC: 27.3 % — LOW (ref 34.5–45)
HCT VFR BLD CALC: 28.6 % — LOW (ref 34.5–45)
HGB BLD-MCNC: 8.9 G/DL — LOW (ref 11.5–15.5)
HGB BLD-MCNC: 9 G/DL — LOW (ref 11.5–15.5)
IMM GRANULOCYTES NFR BLD AUTO: 0.3 % — SIGNIFICANT CHANGE UP (ref 0–0.9)
IMM GRANULOCYTES NFR BLD AUTO: 0.4 % — SIGNIFICANT CHANGE UP (ref 0–0.9)
KETONES UR-MCNC: 80 MG/DL
LDH SERPL L TO P-CCNC: 257 U/L — HIGH (ref 50–242)
LEUKOCYTE ESTERASE UR-ACNC: NEGATIVE — SIGNIFICANT CHANGE UP
LYMPHOCYTES # BLD AUTO: 1.32 K/UL — SIGNIFICANT CHANGE UP (ref 1–3.3)
LYMPHOCYTES # BLD AUTO: 1.51 K/UL — SIGNIFICANT CHANGE UP (ref 1–3.3)
LYMPHOCYTES # BLD AUTO: 19.7 % — SIGNIFICANT CHANGE UP (ref 13–44)
LYMPHOCYTES # BLD AUTO: 22.4 % — SIGNIFICANT CHANGE UP (ref 13–44)
MAGNESIUM SERPL-MCNC: 4.4 MG/DL — HIGH (ref 1.6–2.6)
MCHC RBC-ENTMCNC: 25.2 PG — LOW (ref 27–34)
MCHC RBC-ENTMCNC: 26.2 PG — LOW (ref 27–34)
MCHC RBC-ENTMCNC: 31.1 GM/DL — LOW (ref 32–36)
MCHC RBC-ENTMCNC: 33 GM/DL — SIGNIFICANT CHANGE UP (ref 32–36)
MCV RBC AUTO: 79.6 FL — LOW (ref 80–100)
MCV RBC AUTO: 81 FL — SIGNIFICANT CHANGE UP (ref 80–100)
MONOCYTES # BLD AUTO: 0.62 K/UL — SIGNIFICANT CHANGE UP (ref 0–0.9)
MONOCYTES # BLD AUTO: 0.69 K/UL — SIGNIFICANT CHANGE UP (ref 0–0.9)
MONOCYTES NFR BLD AUTO: 10.2 % — SIGNIFICANT CHANGE UP (ref 2–14)
MONOCYTES NFR BLD AUTO: 9.2 % — SIGNIFICANT CHANGE UP (ref 2–14)
NEUTROPHILS # BLD AUTO: 4.44 K/UL — SIGNIFICANT CHANGE UP (ref 1.8–7.4)
NEUTROPHILS # BLD AUTO: 4.66 K/UL — SIGNIFICANT CHANGE UP (ref 1.8–7.4)
NEUTROPHILS NFR BLD AUTO: 65.8 % — SIGNIFICANT CHANGE UP (ref 43–77)
NEUTROPHILS NFR BLD AUTO: 69.5 % — SIGNIFICANT CHANGE UP (ref 43–77)
NITRITE UR-MCNC: NEGATIVE — SIGNIFICANT CHANGE UP
NRBC # BLD: 0 /100 WBCS — SIGNIFICANT CHANGE UP (ref 0–0)
NRBC # BLD: 0 /100 WBCS — SIGNIFICANT CHANGE UP (ref 0–0)
PH UR: 6.5 — SIGNIFICANT CHANGE UP (ref 5–8)
PLATELET # BLD AUTO: 198 K/UL — SIGNIFICANT CHANGE UP (ref 150–400)
PLATELET # BLD AUTO: 207 K/UL — SIGNIFICANT CHANGE UP (ref 150–400)
POTASSIUM SERPL-MCNC: 3.4 MMOL/L — LOW (ref 3.5–5.3)
POTASSIUM SERPL-SCNC: 3.4 MMOL/L — LOW (ref 3.5–5.3)
PROT ?TM UR-MCNC: 10 MG/DL — SIGNIFICANT CHANGE UP (ref 0–12)
PROT SERPL-MCNC: 6.6 G/DL — SIGNIFICANT CHANGE UP (ref 6–8.3)
PROT UR-MCNC: NEGATIVE MG/DL — SIGNIFICANT CHANGE UP
PROT/CREAT UR-RTO: 0.1 RATIO — SIGNIFICANT CHANGE UP (ref 0–0.2)
RBC # BLD: 3.43 M/UL — LOW (ref 3.8–5.2)
RBC # BLD: 3.53 M/UL — LOW (ref 3.8–5.2)
RBC # FLD: 15.2 % — HIGH (ref 10.3–14.5)
RBC # FLD: 15.2 % — HIGH (ref 10.3–14.5)
RBC CASTS # UR COMP ASSIST: 29 /HPF — HIGH (ref 0–4)
RH IG SCN BLD-IMP: NEGATIVE — SIGNIFICANT CHANGE UP
SODIUM SERPL-SCNC: 137 MMOL/L — SIGNIFICANT CHANGE UP (ref 135–145)
SP GR SPEC: 1.01 — SIGNIFICANT CHANGE UP (ref 1–1.03)
SQUAMOUS # UR AUTO: 4 /HPF — SIGNIFICANT CHANGE UP (ref 0–5)
URATE SERPL-MCNC: 5.2 MG/DL — SIGNIFICANT CHANGE UP (ref 2.5–7)
UROBILINOGEN FLD QL: 0.2 MG/DL — SIGNIFICANT CHANGE UP (ref 0.2–1)
WBC # BLD: 6.71 K/UL — SIGNIFICANT CHANGE UP (ref 3.8–10.5)
WBC # BLD: 6.75 K/UL — SIGNIFICANT CHANGE UP (ref 3.8–10.5)
WBC # FLD AUTO: 6.71 K/UL — SIGNIFICANT CHANGE UP (ref 3.8–10.5)
WBC # FLD AUTO: 6.75 K/UL — SIGNIFICANT CHANGE UP (ref 3.8–10.5)
WBC UR QL: 2 /HPF — SIGNIFICANT CHANGE UP (ref 0–5)

## 2024-06-30 PROCEDURE — 88307 TISSUE EXAM BY PATHOLOGIST: CPT | Mod: 26

## 2024-06-30 PROCEDURE — 86077 PHYS BLOOD BANK SERV XMATCH: CPT

## 2024-06-30 PROCEDURE — 59400 OBSTETRICAL CARE: CPT

## 2024-06-30 RX ORDER — HYDROCORTISONE VALERATE 0.2 %
1 CREAM (GRAM) TOPICAL EVERY 6 HOURS
Refills: 0 | Status: DISCONTINUED | OUTPATIENT
Start: 2024-06-30 | End: 2024-07-02

## 2024-06-30 RX ORDER — OXYTOCIN 30 [USP'U]/500ML
4 INJECTION, SOLUTION INTRAVENOUS
Qty: 30 | Refills: 0 | Status: DISCONTINUED | OUTPATIENT
Start: 2024-06-30 | End: 2024-07-02

## 2024-06-30 RX ORDER — OXYCODONE HYDROCHLORIDE 100 MG/5ML
5 SOLUTION ORAL
Refills: 0 | Status: DISCONTINUED | OUTPATIENT
Start: 2024-06-30 | End: 2024-07-02

## 2024-06-30 RX ORDER — MAGNESIUM SULFATE 100 %
2 POWDER (GRAM) MISCELLANEOUS
Qty: 40 | Refills: 0 | Status: DISCONTINUED | OUTPATIENT
Start: 2024-06-30 | End: 2024-07-02

## 2024-06-30 RX ORDER — SIMETHICONE 40MG/0.6ML
80 SUSPENSION, DROPS(FINAL DOSAGE FORM)(ML) ORAL EVERY 4 HOURS
Refills: 0 | Status: DISCONTINUED | OUTPATIENT
Start: 2024-06-30 | End: 2024-07-02

## 2024-06-30 RX ORDER — HYDROCORTISONE ACETATE 1 %
1 OINTMENT (GRAM) RECTAL EVERY 4 HOURS
Refills: 0 | Status: DISCONTINUED | OUTPATIENT
Start: 2024-06-30 | End: 2024-07-02

## 2024-06-30 RX ORDER — DIPHENHYDRAMINE HCL 12.5MG/5ML
25 ELIXIR ORAL EVERY 6 HOURS
Refills: 0 | Status: DISCONTINUED | OUTPATIENT
Start: 2024-06-30 | End: 2024-07-02

## 2024-06-30 RX ORDER — SODIUM CHLORIDE 0.9 % (FLUSH) 0.9 %
3 SYRINGE (ML) INJECTION EVERY 8 HOURS
Refills: 0 | Status: DISCONTINUED | OUTPATIENT
Start: 2024-06-30 | End: 2024-07-02

## 2024-06-30 RX ORDER — TRISODIUM CITRATE DIHYDRATE AND CITRIC ACID MONOHYDRATE 500; 334 MG/5ML; MG/5ML
15 SOLUTION ORAL EVERY 6 HOURS
Refills: 0 | Status: DISCONTINUED | OUTPATIENT
Start: 2024-06-30 | End: 2024-06-30

## 2024-06-30 RX ORDER — OXYTOCIN 30 [USP'U]/500ML
41.67 INJECTION, SOLUTION INTRAVENOUS
Qty: 20 | Refills: 0 | Status: DISCONTINUED | OUTPATIENT
Start: 2024-06-30 | End: 2024-07-02

## 2024-06-30 RX ORDER — ACETAMINOPHEN 325 MG
975 TABLET ORAL
Refills: 0 | Status: DISCONTINUED | OUTPATIENT
Start: 2024-06-30 | End: 2024-07-02

## 2024-06-30 RX ORDER — DIBUCAINE 1 %
1 OINTMENT (GRAM) TOPICAL EVERY 6 HOURS
Refills: 0 | Status: DISCONTINUED | OUTPATIENT
Start: 2024-06-30 | End: 2024-07-02

## 2024-06-30 RX ORDER — AMPICILLIN TRIHYDRATE 250 MG
2 CAPSULE ORAL ONCE
Refills: 0 | Status: COMPLETED | OUTPATIENT
Start: 2024-06-30 | End: 2024-06-30

## 2024-06-30 RX ORDER — MAGNESIUM SULFATE 100 %
2 POWDER (GRAM) MISCELLANEOUS
Qty: 40 | Refills: 0 | Status: DISCONTINUED | OUTPATIENT
Start: 2024-06-30 | End: 2024-06-30

## 2024-06-30 RX ORDER — PRENATAL VIT/IRON FUM/FOLIC AC 60 MG-1 MG
1 TABLET ORAL DAILY
Refills: 0 | Status: DISCONTINUED | OUTPATIENT
Start: 2024-06-30 | End: 2024-07-02

## 2024-06-30 RX ORDER — MAGNESIUM SULFATE 100 %
4 POWDER (GRAM) MISCELLANEOUS ONCE
Refills: 0 | Status: COMPLETED | OUTPATIENT
Start: 2024-06-30 | End: 2024-06-30

## 2024-06-30 RX ORDER — BENZOCAINE 15 %
1 LIQUID (ML) TOPICAL EVERY 6 HOURS
Refills: 0 | Status: DISCONTINUED | OUTPATIENT
Start: 2024-06-30 | End: 2024-07-02

## 2024-06-30 RX ORDER — DEXTROSE MONOHYDRATE AND SODIUM CHLORIDE 5; .3 G/100ML; G/100ML
1000 INJECTION, SOLUTION INTRAVENOUS
Refills: 0 | Status: DISCONTINUED | OUTPATIENT
Start: 2024-06-30 | End: 2024-06-30

## 2024-06-30 RX ORDER — OXYTOCIN 30 [USP'U]/500ML
333.33 INJECTION, SOLUTION INTRAVENOUS
Qty: 20 | Refills: 0 | Status: DISCONTINUED | OUTPATIENT
Start: 2024-06-30 | End: 2024-07-02

## 2024-06-30 RX ORDER — AMPICILLIN TRIHYDRATE 250 MG
1 CAPSULE ORAL EVERY 4 HOURS
Refills: 0 | Status: DISCONTINUED | OUTPATIENT
Start: 2024-06-30 | End: 2024-06-30

## 2024-06-30 RX ORDER — DEXTROSE MONOHYDRATE AND SODIUM CHLORIDE 5; .3 G/100ML; G/100ML
1000 INJECTION, SOLUTION INTRAVENOUS
Refills: 0 | Status: DISCONTINUED | OUTPATIENT
Start: 2024-06-30 | End: 2024-07-02

## 2024-06-30 RX ORDER — OXYTOCIN 30 [USP'U]/500ML
10 INJECTION, SOLUTION INTRAVENOUS ONCE
Refills: 0 | Status: COMPLETED | OUTPATIENT
Start: 2024-06-30 | End: 2024-06-30

## 2024-06-30 RX ORDER — OXYCODONE HYDROCHLORIDE 100 MG/5ML
5 SOLUTION ORAL ONCE
Refills: 0 | Status: DISCONTINUED | OUTPATIENT
Start: 2024-06-30 | End: 2024-07-02

## 2024-06-30 RX ORDER — PRAMOXINE HCL 1 %
1 CREAM (GRAM) RECTAL EVERY 4 HOURS
Refills: 0 | Status: DISCONTINUED | OUTPATIENT
Start: 2024-06-30 | End: 2024-07-02

## 2024-06-30 RX ORDER — LANOLIN
1 WAX (GRAM) MISCELLANEOUS EVERY 6 HOURS
Refills: 0 | Status: DISCONTINUED | OUTPATIENT
Start: 2024-06-30 | End: 2024-07-02

## 2024-06-30 RX ORDER — TETANUS TOXOID, REDUCED DIPHTHERIA TOXOID AND ACELLULAR PERTUSSIS VACCINE, ADSORBED 5; 2.5; 8; 8; 2.5 [IU]/.5ML; [IU]/.5ML; UG/.5ML; UG/.5ML; UG/.5ML
0.5 SUSPENSION INTRAMUSCULAR ONCE
Refills: 0 | Status: DISCONTINUED | OUTPATIENT
Start: 2024-06-30 | End: 2024-07-02

## 2024-06-30 RX ORDER — KETOROLAC TROMETHAMINE 30 MG/ML
30 INJECTION, SOLUTION INTRAMUSCULAR ONCE
Refills: 0 | Status: DISCONTINUED | OUTPATIENT
Start: 2024-06-30 | End: 2024-06-30

## 2024-06-30 RX ORDER — MAGNESIUM SULFATE 100 %
4 POWDER (GRAM) MISCELLANEOUS ONCE
Refills: 0 | Status: DISCONTINUED | OUTPATIENT
Start: 2024-06-30 | End: 2024-06-30

## 2024-06-30 RX ADMIN — Medication 200 GRAM(S): at 06:49

## 2024-06-30 RX ADMIN — Medication 10 MILLIGRAM(S): at 15:29

## 2024-06-30 RX ADMIN — Medication 975 MILLIGRAM(S): at 22:19

## 2024-06-30 RX ADMIN — Medication 108 GRAM(S): at 10:46

## 2024-06-30 RX ADMIN — KETOROLAC TROMETHAMINE 30 MILLIGRAM(S): 30 INJECTION, SOLUTION INTRAMUSCULAR at 18:31

## 2024-06-30 RX ADMIN — OXYTOCIN 10 UNIT(S): 30 INJECTION, SOLUTION INTRAVENOUS at 17:25

## 2024-06-30 RX ADMIN — Medication 300 GRAM(S): at 15:41

## 2024-06-30 RX ADMIN — OXYTOCIN 125 MILLIUNIT(S)/MIN: 30 INJECTION, SOLUTION INTRAVENOUS at 18:22

## 2024-06-30 RX ADMIN — Medication 108 GRAM(S): at 14:51

## 2024-06-30 RX ADMIN — OXYTOCIN 4 MILLIUNIT(S)/MIN: 30 INJECTION, SOLUTION INTRAVENOUS at 08:23

## 2024-06-30 RX ADMIN — Medication 30 MILLIGRAM(S): at 18:31

## 2024-07-01 LAB
ALBUMIN SERPL ELPH-MCNC: 3 G/DL — LOW (ref 3.3–5)
ALP SERPL-CCNC: 126 U/L — HIGH (ref 40–120)
ALT FLD-CCNC: 24 U/L — SIGNIFICANT CHANGE UP (ref 10–45)
ANION GAP SERPL CALC-SCNC: 10 MMOL/L — SIGNIFICANT CHANGE UP (ref 5–17)
AST SERPL-CCNC: 26 U/L — SIGNIFICANT CHANGE UP (ref 10–40)
BASOPHILS # BLD AUTO: 0.02 K/UL — SIGNIFICANT CHANGE UP (ref 0–0.2)
BASOPHILS NFR BLD AUTO: 0.1 % — SIGNIFICANT CHANGE UP (ref 0–2)
BILIRUB SERPL-MCNC: 0.2 MG/DL — SIGNIFICANT CHANGE UP (ref 0.2–1.2)
BUN SERPL-MCNC: 5 MG/DL — LOW (ref 7–23)
CALCIUM SERPL-MCNC: 7.6 MG/DL — LOW (ref 8.4–10.5)
CHLORIDE SERPL-SCNC: 102 MMOL/L — SIGNIFICANT CHANGE UP (ref 96–108)
CO2 SERPL-SCNC: 21 MMOL/L — LOW (ref 22–31)
CREAT SERPL-MCNC: 0.65 MG/DL — SIGNIFICANT CHANGE UP (ref 0.5–1.3)
EGFR: 116 ML/MIN/1.73M2 — SIGNIFICANT CHANGE UP
EOSINOPHIL # BLD AUTO: 0.03 K/UL — SIGNIFICANT CHANGE UP (ref 0–0.5)
EOSINOPHIL NFR BLD AUTO: 0.2 % — SIGNIFICANT CHANGE UP (ref 0–6)
GLUCOSE SERPL-MCNC: 138 MG/DL — HIGH (ref 70–99)
HCT VFR BLD CALC: 27.6 % — LOW (ref 34.5–45)
HGB BLD-MCNC: 8.7 G/DL — LOW (ref 11.5–15.5)
IMM GRANULOCYTES NFR BLD AUTO: 0.4 % — SIGNIFICANT CHANGE UP (ref 0–0.9)
KLEIHAUER-BETKE CALCULATION: 0 % — SIGNIFICANT CHANGE UP (ref 0–0.2)
LDH SERPL L TO P-CCNC: 292 U/L — HIGH (ref 50–242)
LYMPHOCYTES # BLD AUTO: 1.35 K/UL — SIGNIFICANT CHANGE UP (ref 1–3.3)
LYMPHOCYTES # BLD AUTO: 10.1 % — LOW (ref 13–44)
MAGNESIUM SERPL-MCNC: 5 MG/DL — HIGH (ref 1.6–2.6)
MAGNESIUM SERPL-MCNC: 5.3 MG/DL — HIGH (ref 1.6–2.6)
MCHC RBC-ENTMCNC: 25.4 PG — LOW (ref 27–34)
MCHC RBC-ENTMCNC: 31.5 GM/DL — LOW (ref 32–36)
MCV RBC AUTO: 80.7 FL — SIGNIFICANT CHANGE UP (ref 80–100)
MONOCYTES # BLD AUTO: 1.38 K/UL — HIGH (ref 0–0.9)
MONOCYTES NFR BLD AUTO: 10.3 % — SIGNIFICANT CHANGE UP (ref 2–14)
NEUTROPHILS # BLD AUTO: 10.5 K/UL — HIGH (ref 1.8–7.4)
NEUTROPHILS NFR BLD AUTO: 78.9 % — HIGH (ref 43–77)
NRBC # BLD: 0 /100 WBCS — SIGNIFICANT CHANGE UP (ref 0–0)
PLATELET # BLD AUTO: 220 K/UL — SIGNIFICANT CHANGE UP (ref 150–400)
POTASSIUM SERPL-MCNC: 3.8 MMOL/L — SIGNIFICANT CHANGE UP (ref 3.5–5.3)
POTASSIUM SERPL-SCNC: 3.8 MMOL/L — SIGNIFICANT CHANGE UP (ref 3.5–5.3)
PROT SERPL-MCNC: 6 G/DL — SIGNIFICANT CHANGE UP (ref 6–8.3)
RBC # BLD: 3.42 M/UL — LOW (ref 3.8–5.2)
RBC # FLD: 15.6 % — HIGH (ref 10.3–14.5)
SODIUM SERPL-SCNC: 133 MMOL/L — LOW (ref 135–145)
T PALLIDUM AB TITR SER: NEGATIVE — SIGNIFICANT CHANGE UP
URATE SERPL-MCNC: 5 MG/DL — SIGNIFICANT CHANGE UP (ref 2.5–7)
WBC # BLD: 13.34 K/UL — HIGH (ref 3.8–10.5)
WBC # FLD AUTO: 13.34 K/UL — HIGH (ref 3.8–10.5)

## 2024-07-01 RX ADMIN — Medication 30 MILLIGRAM(S): at 17:59

## 2024-07-01 RX ADMIN — Medication 600 MILLIGRAM(S): at 12:24

## 2024-07-01 RX ADMIN — Medication 975 MILLIGRAM(S): at 21:52

## 2024-07-01 RX ADMIN — Medication 600 MILLIGRAM(S): at 13:10

## 2024-07-01 RX ADMIN — Medication 975 MILLIGRAM(S): at 15:42

## 2024-07-01 RX ADMIN — Medication 600 MILLIGRAM(S): at 05:45

## 2024-07-01 RX ADMIN — Medication 975 MILLIGRAM(S): at 20:59

## 2024-07-01 RX ADMIN — Medication 975 MILLIGRAM(S): at 10:30

## 2024-07-01 RX ADMIN — Medication 600 MILLIGRAM(S): at 17:58

## 2024-07-01 RX ADMIN — Medication 600 MILLIGRAM(S): at 23:20

## 2024-07-01 RX ADMIN — Medication 975 MILLIGRAM(S): at 16:30

## 2024-07-01 RX ADMIN — Medication 975 MILLIGRAM(S): at 02:53

## 2024-07-01 RX ADMIN — Medication 975 MILLIGRAM(S): at 09:44

## 2024-07-01 RX ADMIN — Medication 1 TABLET(S): at 12:24

## 2024-07-01 RX ADMIN — Medication 3 MILLILITER(S): at 21:53

## 2024-07-02 ENCOUNTER — TRANSCRIPTION ENCOUNTER (OUTPATIENT)
Age: 39
End: 2024-07-02

## 2024-07-02 VITALS
TEMPERATURE: 98 F | SYSTOLIC BLOOD PRESSURE: 120 MMHG | DIASTOLIC BLOOD PRESSURE: 69 MMHG | HEART RATE: 67 BPM | RESPIRATION RATE: 18 BRPM | OXYGEN SATURATION: 100 %

## 2024-07-02 PROCEDURE — 88307 TISSUE EXAM BY PATHOLOGIST: CPT

## 2024-07-02 PROCEDURE — 86850 RBC ANTIBODY SCREEN: CPT

## 2024-07-02 PROCEDURE — 36415 COLL VENOUS BLD VENIPUNCTURE: CPT

## 2024-07-02 PROCEDURE — 86901 BLOOD TYPING SEROLOGIC RH(D): CPT

## 2024-07-02 PROCEDURE — 59050 FETAL MONITOR W/REPORT: CPT

## 2024-07-02 PROCEDURE — 86870 RBC ANTIBODY IDENTIFICATION: CPT

## 2024-07-02 PROCEDURE — 86900 BLOOD TYPING SEROLOGIC ABO: CPT

## 2024-07-02 PROCEDURE — 86780 TREPONEMA PALLIDUM: CPT

## 2024-07-02 PROCEDURE — 83735 ASSAY OF MAGNESIUM: CPT

## 2024-07-02 PROCEDURE — 82570 ASSAY OF URINE CREATININE: CPT

## 2024-07-02 PROCEDURE — 85460 HEMOGLOBIN FETAL: CPT

## 2024-07-02 PROCEDURE — 81001 URINALYSIS AUTO W/SCOPE: CPT

## 2024-07-02 PROCEDURE — 85025 COMPLETE CBC W/AUTO DIFF WBC: CPT

## 2024-07-02 PROCEDURE — 83615 LACTATE (LD) (LDH) ENZYME: CPT

## 2024-07-02 PROCEDURE — 84550 ASSAY OF BLOOD/URIC ACID: CPT

## 2024-07-02 PROCEDURE — 80053 COMPREHEN METABOLIC PANEL: CPT

## 2024-07-02 PROCEDURE — 84156 ASSAY OF PROTEIN URINE: CPT

## 2024-07-02 RX ORDER — ACETAMINOPHEN 325 MG
2 TABLET ORAL
Qty: 0 | Refills: 0 | DISCHARGE
Start: 2024-07-02

## 2024-07-02 RX ORDER — SODIUM CHLORIDE 0.9 % (FLUSH) 0.9 %
3 SYRINGE (ML) INJECTION EVERY 8 HOURS
Refills: 0 | Status: DISCONTINUED | OUTPATIENT
Start: 2024-07-02 | End: 2024-07-02

## 2024-07-02 RX ADMIN — Medication 600 MILLIGRAM(S): at 00:30

## 2024-07-02 RX ADMIN — Medication 3 MILLILITER(S): at 03:45

## 2024-07-02 RX ADMIN — Medication 600 MILLIGRAM(S): at 17:25

## 2024-07-02 RX ADMIN — Medication 975 MILLIGRAM(S): at 20:47

## 2024-07-02 RX ADMIN — Medication 1 TABLET(S): at 11:40

## 2024-07-02 RX ADMIN — Medication 975 MILLIGRAM(S): at 09:06

## 2024-07-02 RX ADMIN — Medication 600 MILLIGRAM(S): at 11:39

## 2024-07-02 RX ADMIN — Medication 975 MILLIGRAM(S): at 15:06

## 2024-07-02 RX ADMIN — Medication 30 MILLIGRAM(S): at 17:25

## 2024-07-03 ENCOUNTER — APPOINTMENT (OUTPATIENT)
Dept: OBGYN | Facility: CLINIC | Age: 39
End: 2024-07-03

## 2024-07-03 ENCOUNTER — NON-APPOINTMENT (OUTPATIENT)
Age: 39
End: 2024-07-03

## 2024-07-03 DIAGNOSIS — O14.90 UNSPECIFIED PRE-ECLAMPSIA, UNSPECIFIED TRIMESTER: ICD-10-CM

## 2024-07-03 RX ORDER — NIFEDIPINE 30 MG/1
30 TABLET, FILM COATED, EXTENDED RELEASE ORAL DAILY
Refills: 0 | Status: ACTIVE | COMMUNITY
Start: 2024-07-03

## 2024-07-05 ENCOUNTER — NON-APPOINTMENT (OUTPATIENT)
Age: 39
End: 2024-07-05

## 2024-07-08 ENCOUNTER — APPOINTMENT (OUTPATIENT)
Dept: OBGYN | Facility: CLINIC | Age: 39
End: 2024-07-08

## 2024-07-09 ENCOUNTER — NON-APPOINTMENT (OUTPATIENT)
Age: 39
End: 2024-07-09

## 2024-07-13 LAB — SURGICAL PATHOLOGY STUDY: SIGNIFICANT CHANGE UP

## 2024-07-16 ENCOUNTER — NON-APPOINTMENT (OUTPATIENT)
Age: 39
End: 2024-07-16

## 2024-07-17 ENCOUNTER — APPOINTMENT (OUTPATIENT)
Dept: ANTEPARTUM | Facility: CLINIC | Age: 39
End: 2024-07-17

## 2024-07-18 ENCOUNTER — APPOINTMENT (OUTPATIENT)
Dept: OBGYN | Facility: CLINIC | Age: 39
End: 2024-07-18
Payer: MEDICARE

## 2024-07-18 VITALS — SYSTOLIC BLOOD PRESSURE: 152 MMHG | WEIGHT: 213.8 LBS | BODY MASS INDEX: 31.57 KG/M2 | DIASTOLIC BLOOD PRESSURE: 91 MMHG

## 2024-07-18 PROCEDURE — 0503F POSTPARTUM CARE VISIT: CPT

## 2024-07-23 ENCOUNTER — NON-APPOINTMENT (OUTPATIENT)
Age: 39
End: 2024-07-23

## 2024-07-24 ENCOUNTER — APPOINTMENT (OUTPATIENT)
Dept: OBGYN | Facility: CLINIC | Age: 39
End: 2024-07-24

## 2024-07-24 ENCOUNTER — NON-APPOINTMENT (OUTPATIENT)
Age: 39
End: 2024-07-24

## 2024-07-24 VITALS — DIASTOLIC BLOOD PRESSURE: 81 MMHG | SYSTOLIC BLOOD PRESSURE: 129 MMHG

## 2024-08-06 ENCOUNTER — LABORATORY RESULT (OUTPATIENT)
Age: 39
End: 2024-08-06

## 2024-08-07 ENCOUNTER — APPOINTMENT (OUTPATIENT)
Dept: OBGYN | Facility: CLINIC | Age: 39
End: 2024-08-07

## 2024-08-07 PROBLEM — O09.513 PRIMIGRAVIDA OF ADVANCED MATERNAL AGE IN THIRD TRIMESTER: Status: RESOLVED | Noted: 2023-12-29 | Resolved: 2024-08-07

## 2024-08-07 PROBLEM — O36.80X0 PREGNANCY, LOCATION UNKNOWN: Status: RESOLVED | Noted: 2022-11-16 | Resolved: 2024-08-07

## 2024-08-07 PROBLEM — N93.9 EPISODE OF HEAVY VAGINAL BLEEDING: Status: RESOLVED | Noted: 2022-11-16 | Resolved: 2024-08-07

## 2024-08-07 PROBLEM — Z30.09 BIRTH CONTROL COUNSELING: Status: ACTIVE | Noted: 2024-08-07

## 2024-08-07 PROCEDURE — 0503F POSTPARTUM CARE VISIT: CPT

## 2024-08-12 ENCOUNTER — NON-APPOINTMENT (OUTPATIENT)
Age: 39
End: 2024-08-12

## 2024-08-22 ENCOUNTER — NON-APPOINTMENT (OUTPATIENT)
Age: 39
End: 2024-08-22

## 2024-08-26 ENCOUNTER — NON-APPOINTMENT (OUTPATIENT)
Age: 39
End: 2024-08-26

## 2024-09-26 ENCOUNTER — APPOINTMENT (OUTPATIENT)
Dept: OBGYN | Facility: CLINIC | Age: 39
End: 2024-09-26

## 2024-10-11 ENCOUNTER — APPOINTMENT (OUTPATIENT)
Dept: OBGYN | Facility: CLINIC | Age: 39
End: 2024-10-11
Payer: COMMERCIAL

## 2024-10-11 DIAGNOSIS — Z30.09 ENCOUNTER FOR OTHER GENERAL COUNSELING AND ADVICE ON CONTRACEPTION: ICD-10-CM

## 2024-10-11 PROCEDURE — 99213 OFFICE O/P EST LOW 20 MIN: CPT

## 2024-10-27 ENCOUNTER — NON-APPOINTMENT (OUTPATIENT)
Age: 39
End: 2024-10-27

## 2024-11-14 ENCOUNTER — NON-APPOINTMENT (OUTPATIENT)
Age: 39
End: 2024-11-14

## 2024-11-14 ENCOUNTER — APPOINTMENT (OUTPATIENT)
Dept: CARDIOLOGY | Facility: CLINIC | Age: 39
End: 2024-11-14
Payer: COMMERCIAL

## 2024-11-14 VITALS
HEART RATE: 66 BPM | SYSTOLIC BLOOD PRESSURE: 146 MMHG | HEIGHT: 69 IN | BODY MASS INDEX: 30.96 KG/M2 | DIASTOLIC BLOOD PRESSURE: 85 MMHG | OXYGEN SATURATION: 99 % | WEIGHT: 209 LBS

## 2024-11-14 VITALS — DIASTOLIC BLOOD PRESSURE: 82 MMHG | SYSTOLIC BLOOD PRESSURE: 124 MMHG

## 2024-11-14 DIAGNOSIS — O14.90 UNSPECIFIED PRE-ECLAMPSIA, UNSPECIFIED TRIMESTER: ICD-10-CM

## 2024-11-14 PROCEDURE — 99204 OFFICE O/P NEW MOD 45 MIN: CPT | Mod: 25

## 2024-11-14 PROCEDURE — 93000 ELECTROCARDIOGRAM COMPLETE: CPT

## 2024-11-26 ENCOUNTER — APPOINTMENT (OUTPATIENT)
Dept: OBGYN | Facility: CLINIC | Age: 39
End: 2024-11-26
Payer: COMMERCIAL

## 2024-11-26 VITALS — DIASTOLIC BLOOD PRESSURE: 70 MMHG | SYSTOLIC BLOOD PRESSURE: 128 MMHG

## 2024-11-26 DIAGNOSIS — O20.9 HEMORRHAGE IN EARLY PREGNANCY, UNSPECIFIED: ICD-10-CM

## 2024-11-26 DIAGNOSIS — Z30.430 ENCOUNTER FOR INSERTION OF INTRAUTERINE CONTRACEPTIVE DEVICE: ICD-10-CM

## 2024-11-26 LAB
HCG UR QL: NEGATIVE
QUALITY CONTROL: YES

## 2024-11-26 PROCEDURE — 58300 INSERT INTRAUTERINE DEVICE: CPT

## 2024-11-26 PROCEDURE — 96372 THER/PROPH/DIAG INJ SC/IM: CPT

## 2024-11-26 PROCEDURE — 81025 URINE PREGNANCY TEST: CPT

## 2024-11-26 RX ORDER — LEVONORGESTREL 52 MG/1
20 INTRAUTERINE DEVICE INTRAUTERINE
Qty: 0 | Refills: 0 | Status: COMPLETED | OUTPATIENT
Start: 2024-11-26

## 2024-11-26 RX ADMIN — LEVONORGESTREL MCG/DAY: 52 INTRAUTERINE DEVICE INTRAUTERINE at 00:00

## 2024-11-26 NOTE — CONSULT NOTE ADULT - SUBJECTIVE AND OBJECTIVE BOX
Neurology - Consult Note    -  Spectra: [phone] ([facility]), [phone] ([facility])  -    HPI: Patient KAYLIE HODGES is a 37y (1985) woman with no significant PMH presented vaginal bleeding for 3 weeks after miscarriage (D+C). Neurology consulted for AMS. Per family at bedside and ED, patient had 3 episodes where she lost consciousness, episodes lasted for couple of seconds and then returns to baseline. She is also complaining that she couldn't hear out of her left side of her ear intermittently. Patient reports feeling tired and hungry. She has not eaten today and only drank coffee. Denies weakness, numbness, changes to speech, changes to vision.        Review of Systems:    All other review of systems is negative unless indicated above.    Allergies:      PMHx/PSHx/Family Hx: As above, otherwise see below   No pertinent past medical history        Social Hx:  No current use of tobacco, alcohol, or illicit drugs      Medications:  MEDICATIONS  (STANDING):    MEDICATIONS  (PRN):      Vitals:  T(C): 36.9 (11-09-22 @ 16:50), Max: 36.9 (11-09-22 @ 15:42)  HR: 82 (11-09-22 @ 16:50) (81 - 82)  BP: 104/68 (11-09-22 @ 16:50) (104/68 - 104/75)  RR: 20 (11-09-22 @ 16:50) (20 - 20)  SpO2: 100% (11-09-22 @ 16:50) (96% - 100%)    Physical Examination:  General - NAD  Eyes -  Fundoscopy not performed due to safety precautions in the setting of the COVID-19 pandemic    Neurologic Exam:  Mental status - Awake, Alert, Oriented to person, place, and time. Speech fluent, repetition and naming intact. Follows simple and complex commands. Attention/concentration and fund of knowledge intact    Cranial nerves - PERRL, VFF, EOMI, face sensation (V1-V3) intact b/l, facial strength intact without asymmetry b/l, hearing intact b/l, palate with symmetric elevation, trapezius OR sternocleidomastiod 5/5 strength b/l, tongue midline on protrusion with full lateral movement    Motor - Normal bulk and tone throughout. No pronator drift.  Strength testing            Deltoid      Biceps        Triceps     Wrist Extension                 Wrist Flexion     Interossei         R            5                 5               5                     5                              5                        5                 5  L             5                 5               5                     5                              5                        5                 5              Hip Flexion    Hip Extension          Knee Flexion    Knee Extension      Dorsiflexion    Plantar Flexion  R              5                           5                       5                           5                            5                          5  L              5                           5                        5                           5                            5                          5    Sensation - Light touch/temperature intact throughout    DTR's -             Biceps      Triceps     Brachioradialis      Patellar    Ankle    Toes/plantar response  R             2+             2+                  2+                       2+            2+                 Down  L              2+             2+                 2+                        2+           2+                 Down    Coordination - Finger to Nose intact b/l. No tremors appreciated    Gait and station - Deffered due to fall risk     Labs:                        8.3    6.28  )-----------( 192      ( 09 Nov 2022 18:18 )             26.0     11-09    139  |  105  |  14  ----------------------------<  114<H>  3.9   |  26  |  0.82    Ca    9.3      09 Nov 2022 16:47    TPro  7.0  /  Alb  4.3  /  TBili  0.5  /  DBili  x   /  AST  20  /  ALT  18  /  AlkPhos  43  11-09    CAPILLARY BLOOD GLUCOSE      POCT Blood Glucose.: 139 mg/dL (09 Nov 2022 16:29)    LIVER FUNCTIONS - ( 09 Nov 2022 16:47 )  Alb: 4.3 g/dL / Pro: 7.0 g/dL / ALK PHOS: 43 U/L / ALT: 18 U/L / AST: 20 U/L / GGT: x             PT/INR - ( 09 Nov 2022 16:47 )   PT: 13.6 sec;   INR: 1.17 ratio         PTT - ( 09 Nov 2022 16:47 )  PTT:28.2 sec  CSF:                  Radiology:  CT Head No Cont:  (09 Nov 2022 17:32)  < from: CT Head No Cont (11.09.22 @ 17:32) >    IMPRESSION: No acute intracranial pathology. If altered mental   status/shaking episodes persists, consider further evaluation via MR   imaging to include DWI and ADC mapping techniques, provided there are no   contraindications.    < end of copied text >       no Neurology - Consult Note    -  Spectra: [phone] ([facility]), [phone] ([facility])  -    HPI: Patient KAYLIE HODGES is a 37y (1985) woman with no significant PMH presented vaginal bleeding for 3 weeks after miscarriage (D+C). Neurology consulted for AMS. Per family at bedside and ED, patient had 3 episodes where she lost consciousness, episodes lasted for couple of seconds and then returns to baseline. Patient was noted to have brief twitching episodes after syncopal events. She is also complaining that she couldn't hear out of her left side of her ear intermittently. Patient reports feeling tired and hungry. She has not eaten today and only drank coffee. Denies weakness, numbness, changes to speech, changes to vision.        Review of Systems:    All other review of systems is negative unless indicated above.    Allergies:      PMHx/PSHx/Family Hx: As above, otherwise see below   No pertinent past medical history        Social Hx:  No current use of tobacco, alcohol, or illicit drugs      Medications:  MEDICATIONS  (STANDING):    MEDICATIONS  (PRN):      Vitals:  T(C): 36.9 (11-09-22 @ 16:50), Max: 36.9 (11-09-22 @ 15:42)  HR: 82 (11-09-22 @ 16:50) (81 - 82)  BP: 104/68 (11-09-22 @ 16:50) (104/68 - 104/75)  RR: 20 (11-09-22 @ 16:50) (20 - 20)  SpO2: 100% (11-09-22 @ 16:50) (96% - 100%)    Physical Examination:  General - NAD  Eyes -  Fundoscopy not performed due to safety precautions in the setting of the COVID-19 pandemic    Neurologic Exam:  Mental status - Awake, Alert, Oriented to person, place, and time. Speech fluent, repetition and naming intact. Follows simple and complex commands. Attention/concentration and fund of knowledge intact    Cranial nerves - PERRL, VFF, EOMI, face sensation (V1-V3) intact b/l, facial strength intact without asymmetry b/l, hearing intact b/l, palate with symmetric elevation, trapezius OR sternocleidomastiod 5/5 strength b/l, tongue midline on protrusion with full lateral movement    Motor - Normal bulk and tone throughout. No pronator drift.  Strength testing            Deltoid      Biceps        Triceps     Wrist Extension                 Wrist Flexion     Interossei         R            5                 5               5                     5                              5                        5                 5  L             5                 5               5                     5                              5                        5                 5              Hip Flexion    Hip Extension          Knee Flexion    Knee Extension      Dorsiflexion    Plantar Flexion  R              5                           5                       5                           5                            5                          5  L              5                           5                        5                           5                            5                          5    Sensation - Light touch/temperature intact throughout    DTR's -             Biceps      Triceps     Brachioradialis      Patellar    Ankle    Toes/plantar response  R             2+             2+                  2+                       2+            2+                 Down  L              2+             2+                 2+                        2+           2+                 Down    Coordination - Finger to Nose intact b/l. No tremors appreciated    Gait and station - Deffered due to fall risk     Labs:                        8.3    6.28  )-----------( 192      ( 09 Nov 2022 18:18 )             26.0     11-09    139  |  105  |  14  ----------------------------<  114<H>  3.9   |  26  |  0.82    Ca    9.3      09 Nov 2022 16:47    TPro  7.0  /  Alb  4.3  /  TBili  0.5  /  DBili  x   /  AST  20  /  ALT  18  /  AlkPhos  43  11-09    CAPILLARY BLOOD GLUCOSE      POCT Blood Glucose.: 139 mg/dL (09 Nov 2022 16:29)    LIVER FUNCTIONS - ( 09 Nov 2022 16:47 )  Alb: 4.3 g/dL / Pro: 7.0 g/dL / ALK PHOS: 43 U/L / ALT: 18 U/L / AST: 20 U/L / GGT: x             PT/INR - ( 09 Nov 2022 16:47 )   PT: 13.6 sec;   INR: 1.17 ratio         PTT - ( 09 Nov 2022 16:47 )  PTT:28.2 sec  CSF:                  Radiology:  CT Head No Cont:  (09 Nov 2022 17:32)  < from: CT Head No Cont (11.09.22 @ 17:32) >    IMPRESSION: No acute intracranial pathology. If altered mental   status/shaking episodes persists, consider further evaluation via MR   imaging to include DWI and ADC mapping techniques, provided there are no   contraindications.    < end of copied text >       Neurology - Consult Note    -  Spectra: [phone] ([facility]), [phone] ([facility]  -    HPI: Patient KAYLIE HODGES is a 37y (1985) woman with no significant PMH presented vaginal bleeding for 3 weeks after miscarriage (D+C). Neurology consulted for AMS. Per family at bedside and ED, patient had 3 episodes where she lost consciousness, episodes lasted for couple of seconds and then returns to baseline. Patient was noted to have brief twitching episodes after syncopal events. She is also complaining that she couldn't hear out of her left side of her ear intermittently. Patient reports feeling tired and hungry. She has not eaten today and only drank coffee. Denies weakness, numbness, changes to speech, changes to vision.        Review of Systems:    All other review of systems is negative unless indicated above.    Allergies:  NKDA    PMHx/PSHx/Family Hx: As above, otherwise see below   No pertinent past medical history        Social Hx:  No current use of tobacco, alcohol, or illicit drugs      Medications:  MEDICATIONS  (STANDING):    MEDICATIONS  (PRN):      Vitals:  T(C): 36.9 (11-09-22 @ 16:50), Max: 36.9 (11-09-22 @ 15:42)  HR: 82 (11-09-22 @ 16:50) (81 - 82)  BP: 104/68 (11-09-22 @ 16:50) (104/68 - 104/75)  RR: 20 (11-09-22 @ 16:50) (20 - 20)  SpO2: 100% (11-09-22 @ 16:50) (96% - 100%)    Physical Examination:  General - NAD  CV - Peripheral pulses palpable, no edema  Eyes -  Fundoscopy not performed due to safety precautions in the setting of the COVID-19 pandemic    Neurologic Exam:  Mental status - Awake, Alert, Oriented to person, place, and time. Speech fluent, repetition and naming intact. Follows simple and complex commands. Attention/concentration, recent and remote memory, and fund of knowledge intact    Cranial nerves - PERRL, VFF, EOMI, face sensation (V1-V3) intact b/l, facial strength intact without asymmetry b/l, hearing intact b/l, palate with symmetric elevation, trapezius 5/5 strength b/l, tongue midline on protrusion with full lateral movement    Motor - Normal bulk and tone throughout. No pronator drift.  Strength testing            Deltoid      Biceps        Triceps     Wrist Extension                 Wrist Flexion     Interossei         R            5                 5               5                     5                              5                        5                 5  L             5                 5               5                     5                              5                        5                 5              Hip Flexion    Hip Extension          Knee Flexion    Knee Extension      Dorsiflexion    Plantar Flexion  R              5                           5                       5                           5                            5                          5  L              5                           5                        5                           5                            5                          5    Sensation - Light touch/temperature intact throughout    DTR's -             Biceps      Triceps     Brachioradialis      Patellar    Ankle    Toes/plantar response  R             2+             2+                  2+                       2+            2+                 Down  L              2+             2+                 2+                        2+           2+                 Down    Coordination - Finger to Nose intact b/l. No tremors appreciated    Gait and station - Not assessed due to fall risk     Labs:                        8.3    6.28  )-----------( 192      ( 09 Nov 2022 18:18 )             26.0     11-09    139  |  105  |  14  ----------------------------<  114<H>  3.9   |  26  |  0.82    Ca    9.3      09 Nov 2022 16:47    TPro  7.0  /  Alb  4.3  /  TBili  0.5  /  DBili  x   /  AST  20  /  ALT  18  /  AlkPhos  43  11-09    CAPILLARY BLOOD GLUCOSE      POCT Blood Glucose.: 139 mg/dL (09 Nov 2022 16:29)    LIVER FUNCTIONS - ( 09 Nov 2022 16:47 )  Alb: 4.3 g/dL / Pro: 7.0 g/dL / ALK PHOS: 43 U/L / ALT: 18 U/L / AST: 20 U/L / GGT: x             PT/INR - ( 09 Nov 2022 16:47 )   PT: 13.6 sec;   INR: 1.17 ratio         PTT - ( 09 Nov 2022 16:47 )  PTT:28.2 sec  CSF:                  Radiology:  CT Head No Cont:  (09 Nov 2022 17:32)  < from: CT Head No Cont (11.09.22 @ 17:32) >    IMPRESSION: No acute intracranial pathology. If altered mental   status/shaking episodes persists, consider further evaluation via MR   imaging to include DWI and ADC mapping techniques, provided there are no   contraindications.    < end of copied text >

## 2024-11-28 LAB
C TRACH RRNA SPEC QL NAA+PROBE: NOT DETECTED
N GONORRHOEA RRNA SPEC QL NAA+PROBE: NOT DETECTED
SOURCE AMPLIFICATION: NORMAL

## 2024-12-15 ENCOUNTER — NON-APPOINTMENT (OUTPATIENT)
Age: 39
End: 2024-12-15

## 2025-03-17 ENCOUNTER — APPOINTMENT (OUTPATIENT)
Dept: CARDIOLOGY | Facility: CLINIC | Age: 40
End: 2025-03-17

## 2025-06-25 NOTE — OB RN INTRAOPERATIVE NOTE - NSURGENCYSURGERY1_OBGYN_ALL_OB
Bed: 22  Expected date:   Expected time:   Means of arrival:   Comments:  CS chemical inhalation   Scheduled

## 2025-07-01 ENCOUNTER — EMERGENCY (EMERGENCY)
Facility: HOSPITAL | Age: 40
LOS: 0 days | Discharge: HOME | End: 2025-07-01
Attending: STUDENT IN AN ORGANIZED HEALTH CARE EDUCATION/TRAINING PROGRAM
Payer: COMMERCIAL

## 2025-07-01 VITALS
RESPIRATION RATE: 18 BRPM | SYSTOLIC BLOOD PRESSURE: 128 MMHG | OXYGEN SATURATION: 99 % | DIASTOLIC BLOOD PRESSURE: 71 MMHG | HEART RATE: 80 BPM | TEMPERATURE: 98 F

## 2025-07-01 VITALS — HEART RATE: 63 BPM | OXYGEN SATURATION: 97 %

## 2025-07-01 DIAGNOSIS — H00.016 HORDEOLUM EXTERNUM LEFT EYE, UNSPECIFIED EYELID: ICD-10-CM

## 2025-07-01 DIAGNOSIS — H57.12 OCULAR PAIN, LEFT EYE: ICD-10-CM
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
## 2025-07-01 DIAGNOSIS — S05.02XA INJURY OF CONJUNCTIVA AND CORNEAL ABRASION WITHOUT FOREIGN BODY, LEFT EYE, INITIAL ENCOUNTER: ICD-10-CM

## 2025-07-01 PROCEDURE — 99284 EMERGENCY DEPT VISIT MOD MDM: CPT

## 2025-07-01 RX ORDER — OFLOXACIN 3 MG/ML
1 SOLUTION OPHTHALMIC
Qty: 1 | Refills: 0
Start: 2025-07-01

## 2025-07-01 NOTE — ED PROVIDER NOTE - CLINICAL SUMMARY MEDICAL DECISION MAKING FREE TEXT BOX
40-year-old female no pertinent past medical history who presents for evaluation of lump over the left eyelid which is causing patient discomfort, worse in the mornings, ongoing for approximately 1 week, initially seen at urgent care and was prescribed oral antibiotics which she just completed.  She reports blurry vision over the last 3 days of the left eye. Denies fever. Denies trauma. Reports has been applying warm compresses with minimal relief.   - will rule out corneal abrasion, check intraocular pressure , exam consistent with uninfected hordeolum which will require continued supportive care and opthalmology evaluation - pt agreeable to plan of care Carrillo DO: 40-year-old female no pertinent past medical history who presents for evaluation of lump over the left eyelid which is causing patient discomfort, worse in the mornings, ongoing for approximately 1 week, initially seen at urgent care and was prescribed oral antibiotics which she just completed.  She reports blurry vision over the last 3 days of the left eye. Denies fever. Denies trauma. Reports has been applying warm compresses with minimal relief.   - will perform fluorescein stain to assess for corneal abrasion, check intraocular pressure , exam consistent with uninfected hordeolum which will require continued supportive care and opthalmology evaluation - pt agreeable to plan of care

## 2025-07-01 NOTE — ED PROVIDER NOTE - PATIENT PORTAL LINK FT
You can access the FollowMyHealth Patient Portal offered by [facility] by registering at the following website: [email or web address]. By joining [employer]’s FollowMyHealth portal, you will also be able to view your health information using other applications (apps) compatible with our system.

## 2025-07-01 NOTE — ED PROVIDER NOTE - PROGRESS NOTE DETAILS
RW DINORAH Grider: patient unable to tolerate tonopen exam despite numerous attempts. Fluorescin stain showing uptake across the eye but not an abrasion. However, given uptake, will treat as corneal abrasion. Referral coordinator assisting with appt for close follow up with optho. No concern for orbital or periorbital cellulitis at this time, strict return precautions provided, will dc.

## 2025-07-01 NOTE — ED ADULT NURSE NOTE - OBJECTIVE STATEMENT
41 yo female presents to ED c/o large localized bump on the mid left eyelid, nontender on palpation, Reports blurry vision on the left eye. No drainage noted, denies fever. Denies medical hx

## 2025-07-01 NOTE — ED PROVIDER NOTE - ED STEMI HIDDEN
Referral from ARCHIE Gonzáles CM. Medication history completed by technician: Yes. Pharmacist will contact patient to complete the referral.     Verenice Arguello  [facility] Pharmacy Technician   1/16/2023 10:52 AM   hide

## 2025-07-01 NOTE — ED PROVIDER NOTE - NSFOLLOWUPINSTRUCTIONS_ED_ALL_ED_FT
Today you were seen in the ER for eyelid swelling and corneal abrasion.     A prescription for Ofloxacin drops was sent to the pharmacy. Read all instructions and use as directed.     Corneal Abrasion    The cornea is the clear covering at the front and center of the eye. This very thin tissue is made up of many layers. If a scratch or injury causes the corneal epithelium to come off, it is called a corneal abrasion. Symptoms include eye pain, redness, tearing, difficulty keeping eye open, and light sensitivity. Do not drive or operate machinery if your eye is patched.  Antibiotic eye drops may be prescribed to reduce the risk of infection.  It is important to follow up with an ophthalmologist (eye doctor) to ensure proper healing.    SEEK IMMEDIATE MEDICAL CARE IF YOU HAVE ANY OF THE FOLLOWING SYMPTOMS: discharge from eyes, changes in vision, fever, or swelling.    Advance activity as tolerated.      Continue all previously prescribed medications as directed unless otherwise instructed.      Follow up with your ophthalmology in 48-72 hours- bring copies of your results. none

## 2025-07-01 NOTE — ED PROVIDER NOTE - CARE PLAN
Principal Discharge DX:	Hordeolum eyelid   1 Principal Discharge DX:	Hordeolum eyelid  Secondary Diagnosis:	Corneal abrasion

## 2025-07-01 NOTE — ED ADULT NURSE NOTE - NS ED NURSE LEVEL OF CONSCIOUSNESS ORIENTATION
Mon for sn/sx metamorphopsia. UV prot. Patient understands condition, prognosis and need for follow up care. OCT baseline today. Oriented - self; Oriented - place; Oriented - time

## 2025-07-01 NOTE — ED PROVIDER NOTE - PHYSICAL EXAMINATION
Gen: AOX3, NAD  Head: NCAT  ENT: Airway patent, moist mucous membranes, nasal passageways clear, EOMI, eyes PERRL 4mm b/l, no conjunctival injection, 1x1cm hordeolum left eye without surrounding lid erythema, no ocular discharge   Cardiac: Normal rate  Respiratory: normal respirations and normal work of breathing   Gastrointestinal: Abdomen nondistended, central adiposity present   MSK: No gross abnormalities, FROM of all four extremities  Skin: No rashes, no lesions  Neuro: No gross neurologic deficits, normal speech, no gait abnormality

## 2025-07-10 ENCOUNTER — NON-APPOINTMENT (OUTPATIENT)
Age: 40
End: 2025-07-10

## 2025-07-10 ENCOUNTER — APPOINTMENT (OUTPATIENT)
Age: 40
End: 2025-07-10
Payer: COMMERCIAL

## 2025-07-10 PROCEDURE — 92002 INTRM OPH EXAM NEW PATIENT: CPT | Mod: 25

## 2025-07-10 PROCEDURE — 11900 INJECT SKIN LESIONS </W 7: CPT | Mod: E1
